# Patient Record
Sex: FEMALE | Race: WHITE | NOT HISPANIC OR LATINO | Employment: PART TIME | URBAN - METROPOLITAN AREA
[De-identification: names, ages, dates, MRNs, and addresses within clinical notes are randomized per-mention and may not be internally consistent; named-entity substitution may affect disease eponyms.]

---
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
## 2020-01-14 ENCOUNTER — CONVERSION ENCOUNTER (OUTPATIENT)
Dept: INTERNAL MEDICINE | Facility: CLINIC | Age: 20
End: 2020-01-14

## 2020-01-14 ENCOUNTER — HOSPITAL ENCOUNTER (OUTPATIENT)
Dept: OTHER | Facility: HOSPITAL | Age: 20
Discharge: HOME | End: 2020-01-14
Attending: NURSE PRACTITIONER

## 2020-01-14 ENCOUNTER — OFFICE VISIT CONVERTED (OUTPATIENT)
Dept: INTERNAL MEDICINE | Facility: CLINIC | Age: 20
End: 2020-01-14
Attending: NURSE PRACTITIONER

## 2020-01-14 LAB
ALBUMIN SERPL-MCNC: 4.4 G/DL (ref 3.5–5)
ALBUMIN/GLOB SERPL: 1.5 {RATIO} (ref 1.4–2.6)
ALP SERPL-CCNC: 57 U/L (ref 50–130)
ALT SERPL-CCNC: 21 U/L (ref 10–40)
ANION GAP SERPL CALC-SCNC: 16 MMOL/L (ref 8–19)
AST SERPL-CCNC: 19 U/L (ref 15–50)
BASOPHILS # BLD AUTO: 0.02 10*3/UL (ref 0–0.2)
BASOPHILS NFR BLD AUTO: 0.3 % (ref 0–3)
BILIRUB SERPL-MCNC: 0.4 MG/DL (ref 0.2–1.3)
BUN SERPL-MCNC: 8 MG/DL (ref 5–25)
BUN/CREAT SERPL: 11 {RATIO} (ref 6–20)
CALCIUM SERPL-MCNC: 9.1 MG/DL (ref 8.7–10.4)
CHLORIDE SERPL-SCNC: 101 MMOL/L (ref 99–111)
CHOLEST SERPL-MCNC: 143 MG/DL (ref 107–200)
CHOLEST/HDLC SERPL: 3.9 {RATIO} (ref 3–6)
CONV ABS IMM GRAN: 0.03 10*3/UL (ref 0–0.2)
CONV CO2: 23 MMOL/L (ref 22–32)
CONV IMMATURE GRAN: 0.5 % (ref 0–1.8)
CONV TOTAL PROTEIN: 7.4 G/DL (ref 6.3–8.2)
CREAT UR-MCNC: 0.72 MG/DL (ref 0.5–0.9)
DEPRECATED RDW RBC AUTO: 37 FL (ref 36.4–46.3)
EOSINOPHIL # BLD AUTO: 0.12 10*3/UL (ref 0–0.7)
EOSINOPHIL # BLD AUTO: 1.9 % (ref 0–7)
ERYTHROCYTE [DISTWIDTH] IN BLOOD BY AUTOMATED COUNT: 11.7 % (ref 11.7–14.4)
GFR SERPLBLD BASED ON 1.73 SQ M-ARVRAT: >60 ML/MIN/{1.73_M2}
GLOBULIN UR ELPH-MCNC: 3 G/DL (ref 2–3.5)
GLUCOSE SERPL-MCNC: 88 MG/DL (ref 65–99)
HCT VFR BLD AUTO: 40 % (ref 37–47)
HDLC SERPL-MCNC: 37 MG/DL (ref 40–60)
HGB BLD-MCNC: 13.3 G/DL (ref 12–16)
LDLC SERPL CALC-MCNC: 90 MG/DL (ref 70–100)
LYMPHOCYTES # BLD AUTO: 2.07 10*3/UL (ref 1–5)
LYMPHOCYTES NFR BLD AUTO: 32.1 % (ref 20–45)
MCH RBC QN AUTO: 29 PG (ref 27–31)
MCHC RBC AUTO-ENTMCNC: 33.3 G/DL (ref 33–37)
MCV RBC AUTO: 87.1 FL (ref 81–99)
MONOCYTES # BLD AUTO: 0.55 10*3/UL (ref 0.2–1.2)
MONOCYTES NFR BLD AUTO: 8.5 % (ref 3–10)
NEUTROPHILS # BLD AUTO: 3.66 10*3/UL (ref 2–8)
NEUTROPHILS NFR BLD AUTO: 56.7 % (ref 30–85)
NRBC CBCN: 0 % (ref 0–0.7)
OSMOLALITY SERPL CALC.SUM OF ELEC: 280 MOSM/KG (ref 273–304)
PLATELET # BLD AUTO: 257 10*3/UL (ref 130–400)
PMV BLD AUTO: 10.6 FL (ref 9.4–12.3)
POTASSIUM SERPL-SCNC: 3.9 MMOL/L (ref 3.5–5.3)
RBC # BLD AUTO: 4.59 10*6/UL (ref 4.2–5.4)
SODIUM SERPL-SCNC: 136 MMOL/L (ref 135–147)
TRIGL SERPL-MCNC: 80 MG/DL (ref 40–150)
TSH SERPL-ACNC: 1.55 M[IU]/L (ref 0.27–4.2)
VLDLC SERPL-MCNC: 16 MG/DL (ref 5–37)
WBC # BLD AUTO: 6.45 10*3/UL (ref 4.8–10.8)

## 2020-08-21 ENCOUNTER — TELEMEDICINE CONVERTED (OUTPATIENT)
Dept: INTERNAL MEDICINE | Facility: CLINIC | Age: 20
End: 2020-08-21
Attending: NURSE PRACTITIONER

## 2020-09-21 ENCOUNTER — TELEMEDICINE CONVERTED (OUTPATIENT)
Dept: INTERNAL MEDICINE | Facility: CLINIC | Age: 20
End: 2020-09-21
Attending: NURSE PRACTITIONER

## 2020-12-03 ENCOUNTER — OFFICE VISIT CONVERTED (OUTPATIENT)
Dept: INTERNAL MEDICINE | Facility: CLINIC | Age: 20
End: 2020-12-03
Attending: STUDENT IN AN ORGANIZED HEALTH CARE EDUCATION/TRAINING PROGRAM

## 2020-12-03 ENCOUNTER — HOSPITAL ENCOUNTER (OUTPATIENT)
Dept: OTHER | Facility: HOSPITAL | Age: 20
Discharge: HOME | End: 2020-12-03
Attending: STUDENT IN AN ORGANIZED HEALTH CARE EDUCATION/TRAINING PROGRAM

## 2020-12-07 LAB
CONV HIV-1/ HIV-2: NONREACTIVE
HSV I/II IGM: <0.91 RATIO (ref 0–0.9)
HSV1 IGG SER IA-ACNC: <0.91 INDEX (ref 0–0.9)
HSV2 IGG SER IA-ACNC: <0.91 INDEX (ref 0–0.9)

## 2020-12-08 LAB
C TRACH RRNA CVX QL NAA+PROBE: NEGATIVE
CONV TREPONEMA PALLIDUM (RPR) WITH FTA-ABS, TP-PA REFLEXES: NON REACTIVE
N GONORRHOEA DNA SPEC QL NAA+PROBE: NEGATIVE

## 2021-02-26 ENCOUNTER — OFFICE VISIT CONVERTED (OUTPATIENT)
Dept: INTERNAL MEDICINE | Facility: CLINIC | Age: 21
End: 2021-02-26
Attending: NURSE PRACTITIONER

## 2021-02-26 ENCOUNTER — HOSPITAL ENCOUNTER (OUTPATIENT)
Dept: OTHER | Facility: HOSPITAL | Age: 21
Discharge: HOME | End: 2021-02-26
Attending: NURSE PRACTITIONER

## 2021-02-26 LAB
ALBUMIN SERPL-MCNC: 4.5 G/DL (ref 3.5–5)
ALBUMIN/GLOB SERPL: 1.5 {RATIO} (ref 1.4–2.6)
ALP SERPL-CCNC: 59 U/L (ref 42–98)
ALT SERPL-CCNC: 18 U/L (ref 10–40)
ANION GAP SERPL CALC-SCNC: 17 MMOL/L (ref 8–19)
AST SERPL-CCNC: 20 U/L (ref 15–50)
BASOPHILS # BLD AUTO: 0.02 10*3/UL (ref 0–0.2)
BASOPHILS NFR BLD AUTO: 0.3 % (ref 0–3)
BILIRUB SERPL-MCNC: 0.33 MG/DL (ref 0.2–1.3)
BUN SERPL-MCNC: 11 MG/DL (ref 5–25)
BUN/CREAT SERPL: 13 {RATIO} (ref 6–20)
CALCIUM SERPL-MCNC: 9.2 MG/DL (ref 8.7–10.4)
CHLORIDE SERPL-SCNC: 103 MMOL/L (ref 99–111)
CHOLEST SERPL-MCNC: 144 MG/DL (ref 107–200)
CHOLEST/HDLC SERPL: 2.7 {RATIO} (ref 3–6)
CONV ABS IMM GRAN: 0.01 10*3/UL (ref 0–0.2)
CONV CO2: 25 MMOL/L (ref 22–32)
CONV IMMATURE GRAN: 0.2 % (ref 0–1.8)
CONV TOTAL PROTEIN: 7.5 G/DL (ref 6.3–8.2)
CREAT UR-MCNC: 0.86 MG/DL (ref 0.5–0.9)
DEPRECATED RDW RBC AUTO: 37.2 FL (ref 36.4–46.3)
EOSINOPHIL # BLD AUTO: 0.1 10*3/UL (ref 0–0.7)
EOSINOPHIL # BLD AUTO: 1.6 % (ref 0–7)
ERYTHROCYTE [DISTWIDTH] IN BLOOD BY AUTOMATED COUNT: 11.7 % (ref 11.7–14.4)
GFR SERPLBLD BASED ON 1.73 SQ M-ARVRAT: >60 ML/MIN/{1.73_M2}
GLOBULIN UR ELPH-MCNC: 3 G/DL (ref 2–3.5)
GLUCOSE SERPL-MCNC: 88 MG/DL (ref 65–99)
HCT VFR BLD AUTO: 38.9 % (ref 37–47)
HDLC SERPL-MCNC: 53 MG/DL (ref 40–60)
HGB BLD-MCNC: 13 G/DL (ref 12–16)
LDLC SERPL CALC-MCNC: 77 MG/DL (ref 70–100)
LYMPHOCYTES # BLD AUTO: 2.34 10*3/UL (ref 1–5)
LYMPHOCYTES NFR BLD AUTO: 36.7 % (ref 20–45)
MCH RBC QN AUTO: 29.1 PG (ref 27–31)
MCHC RBC AUTO-ENTMCNC: 33.4 G/DL (ref 33–37)
MCV RBC AUTO: 87.2 FL (ref 81–99)
MONOCYTES # BLD AUTO: 0.61 10*3/UL (ref 0.2–1.2)
MONOCYTES NFR BLD AUTO: 9.6 % (ref 3–10)
NEUTROPHILS # BLD AUTO: 3.3 10*3/UL (ref 2–8)
NEUTROPHILS NFR BLD AUTO: 51.6 % (ref 30–85)
NRBC CBCN: 0 % (ref 0–0.7)
OSMOLALITY SERPL CALC.SUM OF ELEC: 291 MOSM/KG (ref 273–304)
PLATELET # BLD AUTO: 268 10*3/UL (ref 130–400)
PMV BLD AUTO: 10.9 FL (ref 9.4–12.3)
POTASSIUM SERPL-SCNC: 3.6 MMOL/L (ref 3.5–5.3)
RBC # BLD AUTO: 4.46 10*6/UL (ref 4.2–5.4)
SODIUM SERPL-SCNC: 141 MMOL/L (ref 135–147)
TRIGL SERPL-MCNC: 71 MG/DL (ref 40–150)
TSH SERPL-ACNC: 0.94 M[IU]/L (ref 0.27–4.2)
VLDLC SERPL-MCNC: 14 MG/DL (ref 5–37)
WBC # BLD AUTO: 6.38 10*3/UL (ref 4.8–10.8)

## 2021-04-30 ENCOUNTER — CONVERSION ENCOUNTER (OUTPATIENT)
Dept: INTERNAL MEDICINE | Facility: CLINIC | Age: 21
End: 2021-04-30

## 2021-05-13 NOTE — PROGRESS NOTES
Progress Note      Patient Name: Crystal Fitzgerald   Patient ID: [id number]   Sex: Female   YOB: 2000    Primary Care Provider: Lindsay TELLES   Referring Provider: Lindsay TELLES    Visit Date: August 21, 2020    Provider: KOKI French   Location: [facility]   Location Address: [address] [place] [address], [address]  [place], [place]  [id number]   Location Phone: [phone]          Chief Complaint  · recommendations on heart burn issues, medications      History Of Present Illness  Crystal Fitzgerald is a 20 year old /White female who presents for evaluation and treatment of:   Video Conferencing Visit  Crystal Fitzgerald is a 20 year old /White female who is presenting for evaluation via video conferencing via Zoom. Verbal consent obtained before beginning visit.   The following staff were present during this visit: Arti Bain MA; KOKI French      Informed patient that as visit is being performed as a video conference there will be no opportunity to obtain vital signs or perform a thorough physical exam. Due to this there is unfortunately a possibility that things may be missed that would typically be noticed during a traditional visit. Patient is aware of this possibility and agrees to proceed with the video conference. Patient states there is no other person present for this video conference. Call via Zoom.    GERD-  Previously managed with Zantac, has been off for over a year. Patient reports when she went on vacation symptoms returned and have not improved with Pepto Bismol or Tums. She would like to restart a medication at this time. Patient reports constant discomfort, exacerbated by eating. Has vomited on occasion.       Past Medical History  Disease Name Date Onset Notes   Allergies --  --    Asthma --  --    Heart murmur --  --    Pilonidal cyst --  --          Past Surgical History  Procedure Name Date Notes   Ablation 2015 and 2018  --    Appendectomy 2016 --    Cardiac --  --    Cyst Removal 2017 Pilonidal         Allergy List  Allergen Name Date Reaction Notes   amoxicillin --  --  --    iodine --  --  --        Allergies Reconciled  Family Medical History  Disease Name Relative/Age Notes   Family history of prostate cancer  --    Family history of stroke Aunt/  Grandmother (maternal)/   --    Family history of heart disease Grandfather (maternal)/  Grandmother (maternal)/   --    Family history of diabetes mellitus Father/   --          Social History  Finding Status Start/Stop Quantity Notes   Alcohol Never --/-- --  drinks no   Caffeine Current every day --/-- --  drinks regularly; coffee, tea and soft drinks; 3-4 times per day   Second hand smoke exposure Current some day --/-- --  yes   Tobacco Former --/-- --  VAPE         Immunizations  NameDate Admin Mfg Trade Name Lot Number Route Inj VIS Given VIS Publication   Ilskkcjie96/14/2020 PMC Fluzone Quadrivalent [id number] IM RD 01/14/2020    Comments: pt tolerated well   Tdap01/14/2020 SKB BOOSTRIX [id number] IM LD 01/14/2020    Comments: pt tolerated well         Review of Systems  · Constitutional  o Denies  o : fever, fatigue, weight loss, weight gain  · Cardiovascular  o Denies  o : lower extremity edema, chest pressure, palpitations  · Respiratory  o Denies  o : shortness of breath, wheezing, frequent cough, dyspnea on exertion  · Gastrointestinal  o Admits  o : vomiting, reflux/heartburn  o Denies  o : nausea, dysphagia, constipation, diarrhea      Physical Examination     General: Well nourished, no acute distress  HENT: Atraumatic, normocephalic  Eyes: Extraocular movements intact, no scleral icterus  Lungs: Breathing comfortably, without cough  Integumentary: No visible rash or lesion  Neurologic: Grossly oriented to person, place, time; without facial droop  Psych: Normal mood and affect           Assessment  · GERD (gastroesophageal reflux disease)     530.81/K21.9  Patient defers H2  blocker due to concerns regarding Zantac, will trial omeprazole at this time. Discussed lifestyle modifications, including avoiding exacerbating factors, sleeping with the HOB elevated, not lying flat at least 30 minutes after eating. Will follow up in one month to assess medication effectiveness, sooner if concerns arise. Due to patients young age would have low threshold for GI evaluation if warranted.     Problems Reconciled  Plan  · Orders  o ACO-39: Current medications updated and reviewed () - - 08/21/2020  · Medications  o omeprazole 20 mg oral capsule,delayed release(DR/EC)   SIG: take 1 capsule (20 mg) by oral route once daily before a meal   DISP: (30) capsules with 1 refills  Prescribed on 08/21/2020     o Medications have been Reconciled  o Transition of Care or Provider Policy  · Instructions  o Maintain a healthy weight. Avoid tight fitting clothes. Avoid fried, fatty foods, tomato sauce, chocolate, mint, garlic, onion, alcohol. caffeine. Eat smaller meals, dont lie down after a meal, dont smoke. Elevate the head of your bed 6-9 inches.  o Take all medications as prescribed/directed.  o Patient was educated/instructed on their diagnosis, treatment and medications prior to discharge from the clinic today.  o Patient instructed to seek medical attention urgently for new or worsening symptoms.  o Call the office with any concerns or questions.  · Disposition  o Call or Return if symptoms worsen or persist.  o Follow up in 1 month  o Prescriptions sent to pharmacy            Electronically Signed by: KOKI French -Author on August 21, 2020 12:01:59 PM

## 2021-05-13 NOTE — PROGRESS NOTES
"   Progress Note      Patient Name: Crystal Fitzgerald   Patient ID: [id number]   Sex: Female   YOB: 2000    Primary Care Provider: Lindsay TELLES   Referring Provider: Lindsay TELLES    Visit Date: December 3, 2020    Provider: Tammy Adams MD   Location: [facility]   Location Address: [address] [place] [address]  [id number]   Location Phone: [phone]          Chief Complaint  · Acute  · \"I'm getting STD tested.\"      History Of Present Illness  Crystal Fitzgerald is a 20 year old /White female who presents for evaluation and treatment of:      STI/STD screen. Denies any prior hx of STI or STD. She is not currently sexually active, not as of the past 1.5 years. She is attracted to female; lifetime sexual partner is 1 female.          Past Medical History  Disease Name Date Onset Notes   Allergies --  --    Asthma --  --    Heart murmur --  --    Pilonidal cyst --  --          Past Surgical History  Procedure Name Date Notes   Ablation 2015 and 2018 --    Appendectomy 2016 --    Cardiac --  --    Cyst Removal 2017 Pilonidal         Medication List  Name Date Started Instructions   omeprazole 20 mg oral capsule,delayed release(/EC) 08/21/2020 take 1 capsule (20 mg) by oral route once daily before a meal   OMEPRAZOLE 20MG CAPSULES 10/21/2020 TAKE 1 CAPSULE BY MOUTH ONCE DAILY BEFORE A MEAL         Allergy List  Allergen Name Date Reaction Notes   amoxicillin --  --  --    iodine --  --  --          Family Medical History  Disease Name Relative/Age Notes   Family history of prostate cancer  --    Family history of stroke Aunt/  Grandmother (maternal)/   --    Family history of heart disease Grandfather (maternal)/  Grandmother (maternal)/   --    Family history of diabetes mellitus Father/   --          Social History  Finding Status Start/Stop Quantity Notes   Alcohol Never --/-- --  drinks no   Caffeine Current every day --/-- --  drinks " "regularly; coffee, tea and soft drinks; 3-4 times per day   Second hand smoke exposure Current some day --/-- --  yes   Tobacco Former --/-- --  VAPE         Immunizations  NameDate Admin Mfg Trade Name Lot Number Route Inj VIS Given VIS Publication   Qhgvcakwh58/14/2020 PMC Fluzone Quadrivalent [id number] IM RD 01/14/2020    Comments: pt tolerated well   Tdap01/14/2020 SKB BOOSTRIX [id number] IM LD 01/14/2020    Comments: pt tolerated well         Review of Systems  · Constitutional  o Denies  o : fever, fatigue, weight loss, weight gain  · Cardiovascular  o Denies  o : lower extremity edema, claudication, chest pressure, palpitations  · Respiratory  o Denies  o : shortness of breath, wheezing, cough, hemoptysis, dyspnea on exertion  · Gastrointestinal  o Denies  o : nausea, vomiting, diarrhea, constipation, abdominal pain  · Genitourinary  o Denies  o : urgency, frequency, dysuria, genital sores  · Integument  o Denies  o : rash  · Heme-Lymph  o Denies  o : petechiae, lymph node enlargement or tenderness      Vitals  Date Time BP Position Site L\R Cuff Size HR RR TEMP (F) WT  HT  BMI kg/m2 BSA m2 O2 Sat FR L/min FiO2 HC       03/01/2018 10:28 AM       14  218lbs 0oz 5'  6\" 35.19 2.15       01/14/2020 02:13 /74 Sitting    75 - R  97.3 207lbs 2oz 5'  6\" 33.43 2.09 99 %  21%    12/03/2020 04:23 /76 Sitting    75 - R  96.3 193lbs 4oz 5'  6\" 31.19 2.02 100 %  21%          Physical Examination  · Constitutional  o Appearance  o : no acute distress, well-nourished  · Head and Face  o Head  o :   § Inspection  § : atraumatic, normocephalic  · Ears, Nose, Mouth and Throat  o Ears  o :   § External Ears  § : normal  o Nose  o :   § Intranasal Exam  § : nares patent  · Respiratory  o Respiratory Effort  o : breathing comfortably, symmetric chest rise  · Skin and Subcutaneous Tissue  o General Inspection  o : no lesions present, no rashes noted. Tattoos over her right arm.   · Neurologic  o Mental Status Examination  o : "   § Orientation  § : grossly oriented to person, place and time              Assessment  · Screening for HIV (human immunodeficiency virus)     V73.89/Z11.4  Patient without any high risk behavior with 1 lifetime partner, however desires screening which is appropriate.   · STI (sexually transmitted infection)     099.9/A64  Screening for STI in patient with low risk. Lab ordered. Discussed STI/STD prevention and safe sexual practices, including condom use and knowing intimate's partners STD/STI status prior to initiating sexual activity.       Plan  · Orders  o ACO-39: Current medications updated and reviewed (1159F, ) - 099.9/A64, V73.89/Z11.4 - 12/03/2020  o STD Panel (HSV 1 and 2 IgG/IgM, HIV, RPR, GC/Chlamydia) [facility] (52838, 33967, 39694, 25617, 48442, CTNGX, ) - 099.9/A64, V73.89/Z11.4 - 12/03/2020  · Medications  o Medications have been Reconciled  o Transition of Care or Provider Policy  · Instructions  o CDC recommends that everyone between 13 and 64 get tested for HIV at least once as part of routine healthcare.  o Patient was educated/instructed on their diagnosis, treatment and medications prior to discharge from the clinic today.  o Discussed safe sex practices            Electronically Signed by: Tammy Adams MD -Author on December 3, 2020 05:01:52 PM

## 2021-05-13 NOTE — PROGRESS NOTES
Progress Note      Patient Name: Crystal Fitzgerald   Patient ID: [id number]   Sex: Female   YOB: 2000    Primary Care Provider: Lindsay TELLES   Referring Provider: Lindsay TELLES    Visit Date: September 21, 2020    Provider: KOKI French   Location: [facility]   Location Address: [address] [place] [address]  [place], [place]  [id number]   Location Phone: [phone]          Chief Complaint  · Follow up  · No particular concerns      History Of Present Illness  Video Conferencing Visit  Crystal Fitzgerald is a 20 year old /White female who is presenting for evaluation via video conferencing via Zoom. Verbal consent obtained before beginning visit.   The following staff were present during this visit: Araceli Snyder RN; KOKI French      Informed patient that as visit is being performed as a video conference there will be no opportunity to obtain vital signs or perform a thorough physical exam. Due to this there is unfortunately a possibility that things may be missed that would typically be noticed during a traditional visit. Patient is aware of this possibility and agrees to proceed with the video conference. Patient states there is no other person present for this video conference. Call via Zoom.    GERD-  Patient reports symptoms have improved since starting the omeprazole. Reports a few breakthrough symptoms with spicy foods but is otherwise happy with the results.       Past Medical History  Disease Name Date Onset Notes   Allergies --  --    Asthma --  --    Heart murmur --  --    Pilonidal cyst --  --          Past Surgical History  Procedure Name Date Notes   Ablation 2015 and 2018 --    Appendectomy 2016 --    Cardiac --  --    Cyst Removal 2017 Pilonidal         Medication List  Name Date Started Instructions   omeprazole 20 mg oral capsule,delayed release(/EC) 08/21/2020 take 1 capsule (20 mg) by oral route once daily before a meal          Allergy List  Allergen Name Date Reaction Notes   amoxicillin --  --  --    iodine --  --  --        Allergies Reconciled  Family Medical History  Disease Name Relative/Age Notes   Family history of prostate cancer  --    Family history of stroke Aunt/  Grandmother (maternal)/   --    Family history of heart disease Grandfather (maternal)/  Grandmother (maternal)/   --    Family history of diabetes mellitus Father/   --          Social History  Finding Status Start/Stop Quantity Notes   Alcohol Never --/-- --  drinks no   Caffeine Current every day --/-- --  drinks regularly; coffee, tea and soft drinks; 3-4 times per day   Second hand smoke exposure Current some day --/-- --  yes   Tobacco Former --/-- --  VAPE         Immunizations  NameDate Admin Mfg Trade Name Lot Number Route Inj VIS Given VIS Publication   Anujztbzr63/14/2020 PMC Fluzone Quadrivalent [id number] IM RD 01/14/2020    Comments: pt tolerated well   Tdap01/14/2020 SKB BOOSTRIX [id number] IM LD 01/14/2020    Comments: pt tolerated well         Review of Systems  · Constitutional  o Denies  o : fever, fatigue, weight loss, weight gain  · Cardiovascular  o Denies  o : lower extremity edema, chest pressure, palpitations  · Respiratory  o Denies  o : shortness of breath, wheezing, cough, dyspnea on exertion  · Gastrointestinal  o Admits  o : heartburn, reflux  o Denies  o : nausea, vomiting      Physical Examination     General: Well nourished, no acute distress  HENT: Atraumatic, normocephalic  Eyes: Extraocular movements intact, no scleral icterus  Lungs: Breathing comfortably, without cough  Integumentary: No visible rash or lesion  Neurologic: Grossly oriented to person, place, time; without facial droop  Psych: Normal mood and affect               Assessment  · GERD (gastroesophageal reflux disease)     530.81/K21.9  Well controlled, will continue omeprazole at this time. Follow up as scheduled in three months, sooner if concerns arise. Patient will  continue to work on lifestyle modifications as previously discussed.      Plan  · Orders  o ACO-39: Current medications updated and reviewed () - - 09/21/2020  · Medications  o Medications have been Reconciled  o Transition of Care or Provider Policy  · Instructions  o Maintain a healthy weight. Avoid tight fitting clothes. Avoid fried, fatty foods, tomato sauce, chocolate, mint, garlic, onion, alcohol. caffeine. Eat smaller meals, dont lie down after a meal, dont smoke. Elevate the head of your bed 6-9 inches.  o Take all medications as prescribed/directed.  o Patient was educated/instructed on their diagnosis, treatment and medications prior to discharge from the clinic today.  o Patient instructed to seek medical attention urgently for new or worsening symptoms.  o Call the office with any concerns or questions.  · Disposition  o Call or Return if symptoms worsen or persist.  o Follow up as scheduled            Electronically Signed by: KOKI French -Author on September 21, 2020 11:24:54 AM

## 2021-05-14 VITALS
DIASTOLIC BLOOD PRESSURE: 68 MMHG | SYSTOLIC BLOOD PRESSURE: 122 MMHG | WEIGHT: 190 LBS | HEART RATE: 80 BPM | HEIGHT: 66 IN | TEMPERATURE: 98.1 F | BODY MASS INDEX: 30.53 KG/M2 | OXYGEN SATURATION: 100 %

## 2021-05-14 VITALS
TEMPERATURE: 98.5 F | HEART RATE: 92 BPM | WEIGHT: 191 LBS | BODY MASS INDEX: 30.7 KG/M2 | DIASTOLIC BLOOD PRESSURE: 68 MMHG | HEIGHT: 66 IN | OXYGEN SATURATION: 100 % | SYSTOLIC BLOOD PRESSURE: 112 MMHG

## 2021-05-14 VITALS
TEMPERATURE: 96.3 F | HEIGHT: 66 IN | BODY MASS INDEX: 31.06 KG/M2 | DIASTOLIC BLOOD PRESSURE: 76 MMHG | WEIGHT: 193.25 LBS | SYSTOLIC BLOOD PRESSURE: 100 MMHG | HEART RATE: 75 BPM | OXYGEN SATURATION: 100 %

## 2021-05-14 NOTE — PROGRESS NOTES
Progress Note      Patient Name: Crystal Fitzgerald   Patient ID: [id number]   Sex: Female   YOB: 2000    Primary Care Provider: Lindsay TELLES   Referring Provider: Lindsay TELLES    Visit Date: February 26, 2021    Provider: KOKI French   Location: [facility]   Location Address: [address] [place] [address] [address]  [place], [place]  [id number]   Location Phone: [phone]          Chief Complaint  · Annual exam  · No concerns      History Of Present Illness  Crystal Fitzgerald is a 20 year old /White female who presents for evaluation and treatment of:      Influenza vaccination: Would like  LMP: 2/2021  PAP: Not due until 21  Mammogram: Denies family history of breast cancer  Colonoscopy: Denies family history of colon cancer  Eye exam: 1/2021  Dental exam: Every 6 months    Annual physical exam-  Maternal and paternal grandparents with history of heart attack and stroke. Denies chest pain, blurry vision, headache, leg swelling, shortness of breath, seizure activity.     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 Followed by Dr. Fitzgerald in [place], [place] annually. Due to follow up 6/2021. She is without concern at this time.     GERD-  Well controlled with omeprazole.    Skin lesions-  Patient reports lesions noted on the upper part of her vaginal area x 3 months. Negative STD testing 12/2020. Patient states lesions have not changed or spread, are not painful or itchy. Patient does shave. She also reuses towels and washrags. She has never had a PAP smear. Is not currently sexually active but does have a prospective partner. History of one sexual partner, female in gender.       Past Medical History  Disease Name Date Onset Notes   Allergies --  --    Asthma --  --    Heart murmur --  --    Pilonidal cyst --  --    Screening for HIV (human immunodeficiency virus) 12/03/2020 --          Past Surgical History  Procedure Name Date Notes    Ablation 2015 and 2018 --    Appendectomy 2016 --    Cardiac --  --    Cyst Removal 2017 Pilonidal         Medication List  Name Date Started Instructions   omeprazole 20 mg oral capsule,delayed release(/EC) 02/03/2021 take 1 capsule (20 mg) by oral route once daily before a meal         Allergy List  Allergen Name Date Reaction Notes   amoxicillin --  --  --    iodine --  --  --          Family Medical History  Disease Name Relative/Age Notes   Family history of prostate cancer  --    Family history of stroke Aunt/  Grandmother (maternal)/   --    Family history of heart disease Grandfather (maternal)/  Grandmother (maternal)/   --    Family history of diabetes mellitus Father/   --          Social History  Finding Status Start/Stop Quantity Notes   Alcohol Never --/-- --  drinks no   Caffeine Current every day --/-- --  drinks regularly; coffee, tea and soft drinks; 3-4 times per day   Second hand smoke exposure Current some day --/-- --  yes   Tobacco Former --/-- --  VAPE         Immunizations  NameDate Admin Mfg Trade Name Lot Number Route Inj VIS Given VIS Publication   Yhtnztmjx49/26/2021 PMC Fluzone Quadrivalent [id number] IM LD 02/26/2021 08/15/2019   Comments: patient tolerate well. left office in stable condition.   Tdap01/14/2020 SKB BOOSTRIX [id number] IM LD 01/14/2020    Comments: pt tolerated well         Review of Systems  · Constitutional  o Denies  o : fever, fatigue, weight loss, weight gain  · Eyes  o Denies  o : discharge from eye, impaired vision, blurred vision  · HENT  o Denies  o : headaches, vertigo, lightheadedness  · Cardiovascular  o Denies  o : lower extremity edema, chest pressure, palpitations  · Respiratory  o Denies  o : shortness of breath, wheezing, cough, dyspnea on exertion  · Gastrointestinal  o Denies  o : nausea, vomiting, diarrhea, constipation, abdominal pain  · Integument  o Admits  o : new skin lesions  o Denies  o : rash, itching  · Psychiatric  o Denies  o : anxiety,  "depression, suicidal ideation, homicidal ideation      Vitals  Date Time BP Position Site L\R Cuff Size HR RR TEMP (F) WT  HT  BMI kg/m2 BSA m2 O2 Sat FR L/min FiO2 HC       03/01/2018 10:28 AM       14  218lbs 0oz 5'  6\" 35.19 2.15       01/14/2020 02:13 /74 Sitting    75 - R  97.3 207lbs 2oz 5'  6\" 33.43 2.09 99 %  21%    12/03/2020 04:23 /76 Sitting    75 - R  96.3 193lbs 4oz 5'  6\" 31.19 2.02 100 %  21%    02/26/2021 03:31 /68 Sitting    80 - R  98.1 190lbs 0oz 5'  6\" 30.67 2 100 %  21%          Physical Examination  · Constitutional  o Appearance  o : no acute distress, well-nourished  · Head and Face  o Head  o :   § Inspection  § : atraumatic, normocephalic  · Eyes  o Eyes  o : extraocular movements intact, no scleral icterus, no conjunctival injection  · Ears, Nose, Mouth and Throat  o Ears  o :   § External Ears  § : normal  § Otoscopic Examination  § : tympanic membrane appearance within normal limits bilaterally  o Nose  o :   § Intranasal Exam  § : nares patent  o Oral Cavity  o :   § Oral Mucosa  § : moist mucous membranes  o Throat  o :   § Oropharynx  § : no inflammation or lesions present, tonsils within normal limits  · Respiratory  o Respiratory Effort  o : breathing comfortably, symmetric chest rise  o Auscultation of Lungs  o : clear to asculatation bilaterally, no wheezes, rales, or rhonchii  · Cardiovascular  o Heart  o :   § Auscultation of Heart  § : regular rate and rhythm, no murmurs, rubs, or gallops  o Peripheral Vascular System  o :   § Extremities  § : no edema  · Lymphatic  o Neck  o : no lymphadenopathy present  o Supraclavicular Nodes  o : no supraclavicular nodes  · Skin and Subcutaneous Tissue  o General Inspection  o : scattered, grouped, small, dome shaped papular lesions with dimpled center to superior aspect of mons pubis  · Neurologic  o Mental Status Examination  o :   § Orientation  § : grossly oriented to person, place and time  o Gait and Station  o : "   § Gait Screening  § : normal gait  · Psychiatric  o General  o : normal mood and affect              Assessment  · Screening for depression     V79.0/Z13.89  PHQ9 score of 4.  · Need for influenza vaccination     V04.81/Z23  Influenza vaccination in clinic today.   · Annual physical exam     V70.0/Z00.00  Basic labs in clinic today. Encouraged routine dental and eye exams.   · GERD (gastroesophageal reflux disease)     530.81/K21.9  Well controlled, continue omeprazole.   · Skin lesions     709.9/L98.9  High suspicion for molluscum contagiosum based on presentation. Discussed viral etiology and spread of lesions from skin to skin or skin to surface contact. Educated patient that lesions typically resolve on their own within a few months time, however sometimes require intervention such as cryotherapy. Discussed prevention of spread by not touching the lesions and touching other parts of the body or objects. Due to location of lesions encouraged patient to avoid sexual activity to prevent spread to partners. Will follow up in one month to further evaluate, could consider cryotherapy at that visit based on presentation and high risk for spread during sexual activity. STD screening testing reviewed, all negative. Due for PAP smear 6/2021.  · WPW (Tracy-Parkinson-White syndrome)     426.7/I45.6  Follow up with cardiology as scheduled. Will continue to monitor.    Problems Reconciled  Plan  · Orders  o Fluzone Quadrivalent Vaccine, age 6 months + (38807) - V04.81/Z23 - 02/26/2021   Vaccine - Influenza; Dose: 0.5; Site: Left Deltoid; Route: Intramuscular; Date: 02/26/2021 16:25:00; Exp: 06/30/2021; Lot: [id number]; Mfg: sanofi pasteur; TradeName: Fluzone Quadrivalent; Administered By: Thuy Cohen MA; Comment: patient tolerate well. left office in stable condition.  o Physical, Primary Care Panel (CBC, CMP, Lipid, TSH) [facility] (40101, 76999, 97552, 81642) - V70.0/Z00.00 - 02/26/2021  o Immunization Admin Fee (Single) ([facility])  (08178) - - 02/26/2021  o ACO-18: Negative screen for clinical depression using a standardized tool () - - 02/26/2021  o ACO-14: Influenza immunization administered or previously received [facility] () - - 02/26/2021  o ACO-39: Current medications updated and reviewed (, 1159F) - - 02/26/2021  · Medications  o Medications have been Reconciled  o Transition of Care or Provider Policy  · Instructions  o Depression Screen completed and scanned into the EMR under the designated folder within the patient's documents.  o Today's PHQ-9 result is 4  o Reviewed health maintenance flowsheet and updated information. Orders were placed and/or patient's response was documented.  o Maintain a healthy weight. Avoid tight fitting clothes. Avoid fried, fatty foods, tomato sauce, chocolate, mint, garlic, onion, alcohol. caffeine. Eat smaller meals, dont lie down after a meal, dont smoke. Elevate the head of your bed 6-9 inches.  o Take all medications as prescribed/directed.  o Patient was educated/instructed on their diagnosis, treatment and medications prior to discharge from the clinic today.  o Patient instructed to seek medical attention urgently for new or worsening symptoms.  o Call the office with any concerns or questions.  · Disposition  o Call or Return if symptoms worsen or persist.  o Follow up in 1 month  o Labs drawn in clinic  o Will call patient with results of labs            Electronically Signed by: KOKI French -Author on February 26, 2021 05:06:34 PM

## 2021-05-15 VITALS
HEIGHT: 66 IN | BODY MASS INDEX: 33.29 KG/M2 | OXYGEN SATURATION: 99 % | SYSTOLIC BLOOD PRESSURE: 118 MMHG | WEIGHT: 207.12 LBS | HEART RATE: 75 BPM | DIASTOLIC BLOOD PRESSURE: 74 MMHG | TEMPERATURE: 97.3 F

## 2021-05-16 VITALS — WEIGHT: 216 LBS | RESPIRATION RATE: 14 BRPM | BODY MASS INDEX: 34.72 KG/M2 | HEIGHT: 66 IN

## 2021-05-16 VITALS — WEIGHT: 216 LBS | RESPIRATION RATE: 16 BRPM | BODY MASS INDEX: 34.72 KG/M2 | HEIGHT: 66 IN

## 2021-05-16 VITALS — RESPIRATION RATE: 16 BRPM | WEIGHT: 216 LBS | BODY MASS INDEX: 34.72 KG/M2 | HEIGHT: 66 IN

## 2021-05-16 VITALS — WEIGHT: 218 LBS | BODY MASS INDEX: 35.03 KG/M2 | HEIGHT: 66 IN | RESPIRATION RATE: 14 BRPM

## 2021-05-16 VITALS — RESPIRATION RATE: 14 BRPM | HEIGHT: 66 IN | BODY MASS INDEX: 35.03 KG/M2 | WEIGHT: 218 LBS

## 2021-06-21 RX ORDER — OMEPRAZOLE 20 MG/1
CAPSULE, DELAYED RELEASE ORAL
Qty: 30 CAPSULE | Refills: 0 | Status: SHIPPED | OUTPATIENT
Start: 2021-06-21 | End: 2021-09-07

## 2021-06-21 RX ORDER — OMEPRAZOLE 20 MG/1
1 CAPSULE, DELAYED RELEASE ORAL DAILY
COMMUNITY
Start: 2021-03-05 | End: 2021-06-21

## 2021-09-07 RX ORDER — OMEPRAZOLE 20 MG/1
CAPSULE, DELAYED RELEASE ORAL
Qty: 30 CAPSULE | Refills: 0 | Status: SHIPPED | OUTPATIENT
Start: 2021-09-07 | End: 2021-11-22

## 2021-11-10 ENCOUNTER — OFFICE VISIT (OUTPATIENT)
Dept: INTERNAL MEDICINE | Facility: CLINIC | Age: 21
End: 2021-11-10

## 2021-11-10 VITALS
WEIGHT: 192.2 LBS | TEMPERATURE: 98 F | HEIGHT: 66 IN | BODY MASS INDEX: 30.89 KG/M2 | SYSTOLIC BLOOD PRESSURE: 125 MMHG | HEART RATE: 100 BPM | DIASTOLIC BLOOD PRESSURE: 62 MMHG | OXYGEN SATURATION: 100 %

## 2021-11-10 DIAGNOSIS — F41.9 ANXIETY: Primary | ICD-10-CM

## 2021-11-10 PROCEDURE — 99213 OFFICE O/P EST LOW 20 MIN: CPT | Performed by: NURSE PRACTITIONER

## 2021-11-10 RX ORDER — CITALOPRAM 10 MG/1
10 TABLET ORAL DAILY
Qty: 60 TABLET | Refills: 1 | Status: SHIPPED | OUTPATIENT
Start: 2021-11-10 | End: 2022-03-11

## 2021-11-10 NOTE — ASSESSMENT & PLAN NOTE
Patient with anxiety.  She is open to the idea of trying some medication to see if this can be well controlled.  Together with discussion we decided that it is most likely her anxiety that is playing more into her disorganization and inability to complete tasks.  We will try Celexa at this time at 10 mg daily, we will follow-up in 8 weeks and see how things are going.  We did have long discussion about side effects of this medication.  She will let her look let us know that she is trying something new and what the side effects may be.

## 2021-11-10 NOTE — PROGRESS NOTES
"Chief Complaint  ADHD and Anxiety    Subjective         Crystal Fitzgerald presents to [facility] for Questions about ADHD and anxiety.    Patient is a 21-year-old female that is in college in [employer].  Patient reports that over the last several weeks she has noticed that it is extremely hard for her to stay on task, mainly at home.  She feels like she will start several things at once and finds it difficult to complete them.  She notices this at work as well.  She is able to stay on task while in class.  Her grades are mediocre.  She states that as a child she had difficulty but never had a formal diagnosis of ADD or ADHD.  She does report that she has had significant issues with anxiety.  She reports that she has always been very fidgety, has not been on any medications to her knowledge.  She reports that overall her mood is, \" straight down the middle\".  She does not feel extremely high or extremely low.         Review of Systems   Constitutional: Negative for chills, fatigue and fever.   Respiratory: Negative for cough, chest tightness and shortness of breath.    Cardiovascular: Negative for chest pain and palpitations.   Gastrointestinal: Negative for diarrhea, nausea and vomiting.   Neurological: Negative for dizziness and headaches.   Psychiatric/Behavioral: Positive for decreased concentration and dysphoric mood. Negative for agitation, behavioral problems, sleep disturbance and suicidal ideas. The patient is nervous/anxious.        Objective   Vital Signs:   /62   Pulse 100   Temp 98 °F (36.7 °C)   Ht 167.6 cm (66\")   Wt 87.2 kg (192 lb 3.2 oz)   SpO2 100%   BMI 31.02 kg/m²     Physical Exam  Constitutional:       Appearance: Normal appearance. She is normal weight.   Cardiovascular:      Rate and Rhythm: Normal rate and regular rhythm.   Pulmonary:      Effort: Pulmonary effort is normal.      Breath sounds: Normal breath sounds.   Neurological:      " General: No focal deficit present.      Mental Status: She is alert and oriented to person, place, and time.   Psychiatric:         Attention and Perception: Attention and perception normal.         Mood and Affect: Mood is anxious. Mood is not depressed.         Speech: Speech normal.         Behavior: Behavior is cooperative.         Thought Content: Thought content normal.         Judgment: Judgment normal.        Result Review :   The following data was reviewed by: KOKI Gonzales on 11/10/2021:                Diagnoses and all orders for this visit:    1. Anxiety (Primary)  Assessment & Plan:  Patient with anxiety.  She is open to the idea of trying some medication to see if this can be well controlled.  Together with discussion we decided that it is most likely her anxiety that is playing more into her disorganization and inability to complete tasks.  We will try Celexa at this time at 10 mg daily, we will follow-up in 8 weeks and see how things are going.  We did have long discussion about side effects of this medication.  She will let her look let us know that she is trying something new and what the side effects may be.      Other orders  -     citalopram (CeleXA) 10 MG tablet; Take 1 tablet by mouth Daily.  Dispense: 60 tablet; Refill: 1        Follow Up {Instructions Charge Capture  Follow-up Communications :23}  Return in about 8 weeks (around 1/5/2022) for Recheck.  Patient was given instructions and counseling regarding her condition or for health maintenance advice. Please see specific information pulled into the AVS if appropriate.     KOKI Gonzales  11/10/2021  This note was electronically signed.

## 2021-11-22 RX ORDER — OMEPRAZOLE 20 MG/1
CAPSULE, DELAYED RELEASE ORAL
Qty: 90 CAPSULE | Refills: 1 | Status: SHIPPED | OUTPATIENT
Start: 2021-11-22 | End: 2022-06-10

## 2022-03-11 RX ORDER — CITALOPRAM 10 MG/1
10 TABLET ORAL DAILY
Qty: 60 TABLET | Refills: 1 | Status: SHIPPED | OUTPATIENT
Start: 2022-03-11 | End: 2022-07-08

## 2022-06-10 RX ORDER — OMEPRAZOLE 20 MG/1
CAPSULE, DELAYED RELEASE ORAL
Qty: 90 CAPSULE | Refills: 1 | Status: SHIPPED | OUTPATIENT
Start: 2022-06-10 | End: 2023-04-03 | Stop reason: SDUPTHER

## 2022-07-08 RX ORDER — CITALOPRAM 10 MG/1
10 TABLET ORAL DAILY
Qty: 60 TABLET | Refills: 1 | Status: SHIPPED | OUTPATIENT
Start: 2022-07-08

## 2023-03-07 RX ORDER — OMEPRAZOLE 20 MG/1
CAPSULE, DELAYED RELEASE ORAL
Qty: 90 CAPSULE | Refills: 1 | OUTPATIENT
Start: 2023-03-07

## 2023-04-03 ENCOUNTER — TELEPHONE (OUTPATIENT)
Dept: INTERNAL MEDICINE | Facility: CLINIC | Age: 23
End: 2023-04-03

## 2023-04-03 NOTE — TELEPHONE ENCOUNTER
Caller: Crystal Fitzgerald    Relationship: Self    Best call back number: [phone]    Requested Prescriptions:   Requested Prescriptions     Pending Prescriptions Disp Refills   • omeprazole (priLOSEC) 20 MG capsule 90 capsule 1        Pharmacy where request should be sent: [id number] [place] [id number] [facility] [phone] [facility] [phone]      Last office visit with prescribing clinician: Visit date not found   Last telemedicine visit with prescribing clinician: 5/19/2023   Next office visit with prescribing clinician: 5/19/2023         Does the patient have less than a 3 day supply:  [x] Yes  [] No      Zakia Herrmann Rep   04/03/23 11:40 EDT

## 2023-04-06 RX ORDER — OMEPRAZOLE 20 MG/1
20 CAPSULE, DELAYED RELEASE ORAL DAILY
Qty: 90 CAPSULE | Refills: 1 | Status: SHIPPED | OUTPATIENT
Start: 2023-04-06

## 2023-04-10 NOTE — TELEPHONE ENCOUNTER
Attempted to call pt last Wednesday and today. No answer on any numbers. Pt may call the office with further questions. Medication sent to pharmacy last week.

## 2023-05-23 ENCOUNTER — OFFICE VISIT (OUTPATIENT)
Dept: INTERNAL MEDICINE | Facility: CLINIC | Age: 23
End: 2023-05-23
Payer: COMMERCIAL

## 2023-05-23 VITALS
SYSTOLIC BLOOD PRESSURE: 116 MMHG | WEIGHT: 187.8 LBS | TEMPERATURE: 97.2 F | HEART RATE: 82 BPM | DIASTOLIC BLOOD PRESSURE: 69 MMHG | BODY MASS INDEX: 30.31 KG/M2 | OXYGEN SATURATION: 97 %

## 2023-05-23 DIAGNOSIS — R41.840 DIFFICULTY CONCENTRATING: ICD-10-CM

## 2023-05-23 DIAGNOSIS — Z00.00 ANNUAL PHYSICAL EXAM: Primary | ICD-10-CM

## 2023-05-23 DIAGNOSIS — Z86.79 HISTORY OF WOLFF-PARKINSON-WHITE (WPW) SYNDROME: ICD-10-CM

## 2023-05-23 DIAGNOSIS — R41.840 INATTENTION: ICD-10-CM

## 2023-05-23 DIAGNOSIS — Q22.5 EBSTEIN'S ANOMALY: ICD-10-CM

## 2023-05-23 LAB
ALBUMIN SERPL-MCNC: 4.6 G/DL (ref 3.5–5.2)
ALBUMIN/GLOB SERPL: 1.5 G/DL
ALP SERPL-CCNC: 60 U/L (ref 39–117)
ALT SERPL W P-5'-P-CCNC: 19 U/L (ref 1–33)
ANION GAP SERPL CALCULATED.3IONS-SCNC: 10 MMOL/L (ref 5–15)
AST SERPL-CCNC: 19 U/L (ref 1–32)
BASOPHILS # BLD AUTO: 0.03 10*3/MM3 (ref 0–0.2)
BASOPHILS NFR BLD AUTO: 0.5 % (ref 0–1.5)
BILIRUB SERPL-MCNC: 0.3 MG/DL (ref 0–1.2)
BUN SERPL-MCNC: 11 MG/DL (ref 6–20)
BUN/CREAT SERPL: 14.7 (ref 7–25)
CALCIUM SPEC-SCNC: 9.7 MG/DL (ref 8.6–10.5)
CHLORIDE SERPL-SCNC: 104 MMOL/L (ref 98–107)
CHOLEST SERPL-MCNC: 148 MG/DL (ref 0–200)
CO2 SERPL-SCNC: 26 MMOL/L (ref 22–29)
CREAT SERPL-MCNC: 0.75 MG/DL (ref 0.57–1)
DEPRECATED RDW RBC AUTO: 38.5 FL (ref 37–54)
EGFRCR SERPLBLD CKD-EPI 2021: 115.6 ML/MIN/1.73
EOSINOPHIL # BLD AUTO: 0.12 10*3/MM3 (ref 0–0.4)
EOSINOPHIL NFR BLD AUTO: 1.9 % (ref 0.3–6.2)
ERYTHROCYTE [DISTWIDTH] IN BLOOD BY AUTOMATED COUNT: 12.1 % (ref 12.3–15.4)
GLOBULIN UR ELPH-MCNC: 3 GM/DL
GLUCOSE SERPL-MCNC: 91 MG/DL (ref 65–99)
HCT VFR BLD AUTO: 42.8 % (ref 34–46.6)
HDLC SERPL-MCNC: 41 MG/DL (ref 40–60)
HGB BLD-MCNC: 14.2 G/DL (ref 12–15.9)
IMM GRANULOCYTES # BLD AUTO: 0.02 10*3/MM3 (ref 0–0.05)
IMM GRANULOCYTES NFR BLD AUTO: 0.3 % (ref 0–0.5)
LDLC SERPL CALC-MCNC: 77 MG/DL (ref 0–100)
LDLC/HDLC SERPL: 1.76 {RATIO}
LYMPHOCYTES # BLD AUTO: 1.93 10*3/MM3 (ref 0.7–3.1)
LYMPHOCYTES NFR BLD AUTO: 30.7 % (ref 19.6–45.3)
MCH RBC QN AUTO: 28.9 PG (ref 26.6–33)
MCHC RBC AUTO-ENTMCNC: 33.2 G/DL (ref 31.5–35.7)
MCV RBC AUTO: 87 FL (ref 79–97)
MONOCYTES # BLD AUTO: 0.58 10*3/MM3 (ref 0.1–0.9)
MONOCYTES NFR BLD AUTO: 9.2 % (ref 5–12)
NEUTROPHILS NFR BLD AUTO: 3.61 10*3/MM3 (ref 1.7–7)
NEUTROPHILS NFR BLD AUTO: 57.4 % (ref 42.7–76)
NRBC BLD AUTO-RTO: 0 /100 WBC (ref 0–0.2)
PLATELET # BLD AUTO: 280 10*3/MM3 (ref 140–450)
PMV BLD AUTO: 11.2 FL (ref 6–12)
POTASSIUM SERPL-SCNC: 4.3 MMOL/L (ref 3.5–5.2)
PROT SERPL-MCNC: 7.6 G/DL (ref 6–8.5)
RBC # BLD AUTO: 4.92 10*6/MM3 (ref 3.77–5.28)
SODIUM SERPL-SCNC: 140 MMOL/L (ref 136–145)
T4 FREE SERPL-MCNC: 1.48 NG/DL (ref 0.93–1.7)
TRIGL SERPL-MCNC: 175 MG/DL (ref 0–150)
TSH SERPL DL<=0.05 MIU/L-ACNC: 1.02 UIU/ML (ref 0.27–4.2)
VLDLC SERPL-MCNC: 30 MG/DL (ref 5–40)
WBC NRBC COR # BLD: 6.29 10*3/MM3 (ref 3.4–10.8)

## 2023-05-23 PROCEDURE — 99395 PREV VISIT EST AGE 18-39: CPT | Performed by: NURSE PRACTITIONER

## 2023-05-23 PROCEDURE — 84439 ASSAY OF FREE THYROXINE: CPT | Performed by: NURSE PRACTITIONER

## 2023-05-23 PROCEDURE — 80050 GENERAL HEALTH PANEL: CPT | Performed by: NURSE PRACTITIONER

## 2023-05-23 PROCEDURE — 80061 LIPID PANEL: CPT | Performed by: NURSE PRACTITIONER

## 2023-05-23 PROCEDURE — 36415 COLL VENOUS BLD VENIPUNCTURE: CPT | Performed by: NURSE PRACTITIONER

## 2023-05-23 NOTE — PROGRESS NOTES
Chief Complaint  Annual Exam (Physical )    Subjective         Crystal Fitzgerald presents to [facility]  Last labs: 2021  LMP: May 2023  PAP: 2023, normal   Mammogram: Denies family history of breast cancer  Colonoscopy: Denies family history of colon cancer  COVID19 vaccination: Declines  Eye exam: 2023  Dental exam: 2023  Smoking history: Social   Specialists: None    Annual physical exam-  Patient reports her maternal grandfather had a heart attack and stroke, maternal grandmother also with cardiac issues. Denies chest pain, blurry vision, headache, leg swelling, shortness of breath, seizure activity    Patient with history of WPW with surgery x2.  Also with Ebstein's anomaly.  Patient admits to palpitations with exertion.  At times feels that her heart stops, goes fast or switches beats quickly.  States this is not as bad as it was prior to her surgery.  She is supposed to follow with a cardiologist but has not been.  Is not interested in referral or further evaluation at this time.    Patient would like to be evaluated for ADHD.  Admits that she has trouble focusing, often has trouble remembering what she did 10 minutes ago.  She was previously started on Celexa but this did not help.  Patient states she feels like she is losing chunks of time due to having too much going on in her head.  She has been able to maintain her grades, but admits to procrastination.  She has tried making lists but still cannot stick to her plan.  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           Objective     Vitals:    05/23/23 1342   BP: 116/69   Pulse: 82   Temp: 97.2 °F (36.2 °C)   TempSrc: Temporal   SpO2: 97%   Weight: 85.2 kg (187 lb 12.8 oz)      Body mass index is 30.31 kg/m².    Wt Readings from Last 3 Encounters:   05/23/23 85.2 kg (187 lb 12.8 oz)   11/10/21 87.2 kg (192 lb 3.2  oz)   04/30/21 86.6 kg (191 lb)     BP Readings from Last 3 Encounters:   05/23/23 116/69   11/10/21 125/62   04/30/21 112/68                Physical Exam  Constitutional:       Appearance: Normal appearance.   HENT:      Head: Normocephalic and atraumatic.      Right Ear: Tympanic membrane normal.      Left Ear: Tympanic membrane normal.      Nose: Nose normal.      Mouth/Throat:      Mouth: Mucous membranes are moist.      Pharynx: Oropharynx is clear.   Eyes:      Extraocular Movements: Extraocular movements intact.      Conjunctiva/sclera: Conjunctivae normal.      Pupils: Pupils are equal, round, and reactive to light.   Neck:      Thyroid: No thyroid mass, thyromegaly or thyroid tenderness.   Cardiovascular:      Rate and Rhythm: Normal rate and regular rhythm.      Heart sounds: Normal heart sounds.   Pulmonary:      Effort: Pulmonary effort is normal.      Breath sounds: Normal breath sounds.   Abdominal:      General: Bowel sounds are normal.      Palpations: Abdomen is soft.   Skin:     General: Skin is warm and dry.   Neurological:      General: No focal deficit present.      Mental Status: She is alert and oriented to person, place, and time.   Psychiatric:         Mood and Affect: Mood normal.         Behavior: Behavior normal.         Thought Content: Thought content normal.          Result Review :   The following data was reviewed by: KOKI French on 05/23/2023:      Procedures    Assessment and Plan   Diagnoses and all orders for this visit:    1. Annual physical exam (Primary)  Assessment & Plan:  Basic labs in clinic today. Encouraged routine dental and eye exams. Discussed age appropriate immunizations, screenings, nutrition and exercise. Age appropriate handout provided.      Orders:  -     Comprehensive Metabolic Panel  -     CBC & Differential  -     TSH  -     Lipid Panel  -     T4, Free    2. Difficulty concentrating  Assessment & Plan:  Will refer to psychiatry for evaluation for  adult ADHD.  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    Orders:  -     Cancel: Ambulatory Referral to Psychiatry  -     Ambulatory Referral to Psychiatry    3. Inattention  -     Cancel: Ambulatory Referral to Psychiatry  -     Ambulatory Referral to Psychiatry    4. Ebstein's anomaly  Assessment & Plan:  Discussed with patient the importance of cardiology referral, states she will consider but is not interested at this time.  She should seek medical attention immediately with severe/persistent chest pain, palpitations, shortness of breath.  She will call return to clinic if she would like to proceed with referral or EKG.      5. History of Tracy-Parkinson-White (WPW) syndrome        Follow Up   Return for Annual physical.  Patient was given instructions and counseling regarding her condition or for health maintenance advice. Please see specific information pulled into the AVS if appropriate.

## 2023-05-24 PROBLEM — R41.840 INATTENTION: Status: ACTIVE | Noted: 2023-05-24

## 2023-05-24 PROBLEM — Q22.5 EBSTEIN'S ANOMALY: Status: ACTIVE | Noted: 2023-05-24

## 2023-05-24 PROBLEM — Z86.79 HISTORY OF WOLFF-PARKINSON-WHITE (WPW) SYNDROME: Status: ACTIVE | Noted: 2023-05-24

## 2023-05-24 PROBLEM — R41.840 DIFFICULTY CONCENTRATING: Status: ACTIVE | Noted: 2023-05-24

## 2023-05-24 PROBLEM — Z00.00 ANNUAL PHYSICAL EXAM: Status: ACTIVE | Noted: 2023-05-24

## 2023-05-24 NOTE — ASSESSMENT & PLAN NOTE
Will refer to psychiatry for evaluation for adult ADHD.  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

## 2023-05-24 NOTE — ASSESSMENT & PLAN NOTE
Discussed with patient the importance of cardiology referral, states she will consider but is not interested at this time.  She should seek medical attention immediately with severe/persistent chest pain, palpitations, shortness of breath.  She will call return to clinic if she would like to proceed with referral or EKG.

## 2023-06-02 ENCOUNTER — LAB (OUTPATIENT)
Dept: LAB | Facility: HOSPITAL | Age: 23
End: 2023-06-02
Payer: COMMERCIAL

## 2023-06-02 DIAGNOSIS — Z20.822 COUGH WITH EXPOSURE TO COVID-19 VIRUS: Primary | ICD-10-CM

## 2023-06-02 DIAGNOSIS — R05.8 COUGH WITH EXPOSURE TO COVID-19 VIRUS: Primary | ICD-10-CM

## 2023-06-02 LAB — SARS-COV-2 RNA RESP QL NAA+PROBE: DETECTED

## 2023-06-02 PROCEDURE — 87635 SARS-COV-2 COVID-19 AMP PRB: CPT

## 2024-01-15 RX ORDER — OMEPRAZOLE 20 MG/1
20 CAPSULE, DELAYED RELEASE ORAL DAILY
Qty: 90 CAPSULE | Refills: 1 | Status: SHIPPED | OUTPATIENT
Start: 2024-01-15

## 2024-03-29 ENCOUNTER — OFFICE VISIT (OUTPATIENT)
Dept: INTERNAL MEDICINE | Facility: CLINIC | Age: 24
End: 2024-03-29
Payer: COMMERCIAL

## 2024-03-29 VITALS
TEMPERATURE: 98.3 F | DIASTOLIC BLOOD PRESSURE: 80 MMHG | OXYGEN SATURATION: 96 % | HEIGHT: 66 IN | WEIGHT: 191.2 LBS | SYSTOLIC BLOOD PRESSURE: 118 MMHG | HEART RATE: 84 BPM | BODY MASS INDEX: 30.73 KG/M2

## 2024-03-29 DIAGNOSIS — N89.8 VAGINAL DISCHARGE: ICD-10-CM

## 2024-03-29 DIAGNOSIS — F41.1 GENERALIZED ANXIETY DISORDER: ICD-10-CM

## 2024-03-29 DIAGNOSIS — Z86.79 HISTORY OF WOLFF-PARKINSON-WHITE (WPW) SYNDROME: ICD-10-CM

## 2024-03-29 DIAGNOSIS — R41.840 INATTENTION: ICD-10-CM

## 2024-03-29 DIAGNOSIS — Q22.5 EBSTEIN'S ANOMALY: ICD-10-CM

## 2024-03-29 DIAGNOSIS — Z01.419 ENCOUNTER FOR ROUTINE GYNECOLOGICAL EXAMINATION WITH PAPANICOLAOU SMEAR OF CERVIX: Primary | ICD-10-CM

## 2024-03-29 DIAGNOSIS — R41.840 DIFFICULTY CONCENTRATING: ICD-10-CM

## 2024-03-29 LAB
CANDIDA SPECIES: NEGATIVE
GARDNERELLA VAGINALIS: NEGATIVE
T VAGINALIS DNA VAG QL PROBE+SIG AMP: NEGATIVE

## 2024-03-29 PROCEDURE — 87480 CANDIDA DNA DIR PROBE: CPT | Performed by: NURSE PRACTITIONER

## 2024-03-29 PROCEDURE — G0123 SCREEN CERV/VAG THIN LAYER: HCPCS | Performed by: NURSE PRACTITIONER

## 2024-03-29 PROCEDURE — 99395 PREV VISIT EST AGE 18-39: CPT | Performed by: NURSE PRACTITIONER

## 2024-03-29 PROCEDURE — 87510 GARDNER VAG DNA DIR PROBE: CPT | Performed by: NURSE PRACTITIONER

## 2024-03-29 PROCEDURE — 87660 TRICHOMONAS VAGIN DIR PROBE: CPT | Performed by: NURSE PRACTITIONER

## 2024-03-29 NOTE — PROGRESS NOTES
Subjective   History of Present Illness    Crystal Fitzgerald is a 23 y.o. female who presents for annual exam.    Obstetric History:  OB History    No obstetric history on file.        Menstrual History:     No LMP recorded.       Sexual History:               Current contraception: none, two days late on period, denies potential pregnancy  History of abnormal Pap smear: no  Received Gardasil immunization: no  Family history of uterine or ovarian cancer: no  Family History of cervical cancer: no  Family History of colon cancer/colon polyps: no  Regular self breast exam: yes  History of abnormal mammogram: no  Family history of breast cancer: no  History of abnormal lipids: no    She would like to have her dog registered as an emotional support animal.  Needs a referral to local cardiologist for WPW.     The following portions of the patient's history were reviewed and updated as appropriate: allergies, current medications, past family history, past medical history, past social history, past surgical history, and problem list.      Objective     Physical Exam  Vitals and nursing note reviewed. Exam conducted with a chaperone present (Melony PAGE).   Constitutional:       Appearance: Normal appearance.   Cardiovascular:      Rate and Rhythm: Normal rate and regular rhythm.   Pulmonary:      Effort: Pulmonary effort is normal.      Breath sounds: Normal breath sounds.   Chest:   Breasts:     Right: Normal.      Left: Normal.   Abdominal:      General: Bowel sounds are normal.      Palpations: Abdomen is soft.   Genitourinary:     Vagina: Normal.      Cervix: Normal.      Uterus: Normal.       Adnexa: Right adnexa normal and left adnexa normal.      Rectum: Normal.      Comments: White discharge and bleeding noted from cervix  Musculoskeletal:      Cervical back: Normal range of motion and neck supple.   Lymphadenopathy:      Upper Body:      Right upper body: No axillary adenopathy.      Left upper body: No axillary  "adenopathy.   Neurological:      General: No focal deficit present.      Mental Status: She is alert and oriented to person, place, and time.   Psychiatric:         Mood and Affect: Mood normal.           /80 (BP Location: Left arm, Patient Position: Sitting, Cuff Size: Adult)   Pulse 84   Temp 98.3 °F (36.8 °C) (Temporal)   Ht 167.6 cm (65.98\")   Wt 86.7 kg (191 lb 3.2 oz)   SpO2 96%   BMI 30.88 kg/m²       Assessment & Plan   Diagnoses and all orders for this visit:    1. Encounter for routine gynecological examination with Papanicolaou smear of cervix (Primary)  Assessment & Plan:  Exam findings likely secondary to menses. Discussed preventative care with routine PAP smears and mammogram starting at age 40. Will determine further intervention based on results of PAP smear.      Orders:  -     IGP,rfx Aptima HPV All Pth  -     Gardnerella vaginalis, Trichomonas vaginalis, Candida albicans, DNA - Swab, Vagina    2. Vaginal discharge  -     Gardnerella vaginalis, Trichomonas vaginalis, Candida albicans, DNA - Swab, Vagina    3. Generalized anxiety disorder  Assessment & Plan:  Referral to psychiatry to discuss further.    Orders:  -     Ambulatory Referral to Psychiatry    4. Difficulty concentrating  -     Ambulatory Referral to Psychiatry    5. Inattention  -     Ambulatory Referral to Psychiatry    6. History of Tracy-Parkinson-White (WPW) syndrome  Assessment & Plan:  Referral to cardiology.     Orders:  -     Ambulatory Referral to Cardiology    7. Ebstein's anomaly  -     Ambulatory Referral to Cardiology        Await pap smear results.             "

## 2024-03-29 NOTE — ASSESSMENT & PLAN NOTE
Exam findings likely secondary to menses. Discussed preventative care with routine PAP smears and mammogram starting at age 40. Will determine further intervention based on results of PAP smear.

## 2024-04-03 LAB
CONV .: NORMAL
CYTOLOGIST CVX/VAG CYTO: NORMAL
CYTOLOGY CVX/VAG DOC CYTO: NORMAL
CYTOLOGY CVX/VAG DOC THIN PREP: NORMAL
DX ICD CODE: NORMAL
Lab: NORMAL
OTHER STN SPEC: NORMAL
STAT OF ADQ CVX/VAG CYTO-IMP: NORMAL

## 2024-05-20 ENCOUNTER — DOCUMENTATION (OUTPATIENT)
Dept: PSYCHIATRY | Facility: CLINIC | Age: 24
End: 2024-05-20

## 2024-05-20 ENCOUNTER — OFFICE VISIT (OUTPATIENT)
Dept: PSYCHIATRY | Facility: CLINIC | Age: 24
End: 2024-05-20
Payer: COMMERCIAL

## 2024-05-20 VITALS
SYSTOLIC BLOOD PRESSURE: 114 MMHG | BODY MASS INDEX: 30.18 KG/M2 | DIASTOLIC BLOOD PRESSURE: 60 MMHG | HEART RATE: 104 BPM | WEIGHT: 187.8 LBS | HEIGHT: 66 IN

## 2024-05-20 DIAGNOSIS — F41.1 GAD (GENERALIZED ANXIETY DISORDER): Primary | ICD-10-CM

## 2024-05-20 DIAGNOSIS — F43.10 POST TRAUMATIC STRESS DISORDER (PTSD): ICD-10-CM

## 2024-05-20 PROCEDURE — 90792 PSYCH DIAG EVAL W/MED SRVCS: CPT

## 2024-05-20 NOTE — PROGRESS NOTES
"[facility]  Initial Evaluation  TRIGGER WARNING     Referring Provider:  Lindsay Owusu, APRN  [address] [id number]    Chief Complaint: \"I am here for an DIANNE letter\"    History of Present Illness: Crystal Fitzgerald is a 23 y.o. female who presents today for initial evaluation regarding needing a DIANNE for her dog. Crystal presents accompanied with her partner, Leeann in no acute distress and engages with me appropriately. Psychotropic regimen with which patient presents is described as nothing.   Cassie is very guarded, trembling, and her eyes are downcast. She is picking at her eyebrows. Leeann, her partner, offered to step out but patient asked that she stays for support. Begrudgingly, patient discloses answers to my battery of questions. 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 Cassie is hesitant and evasive at times. Neither denying or confirming marijuana use. I provide information on the risks with using concentrated Delta 9 products and risks of psychoses, and supraventricular tachycardia.   Cassie   History is positive for signs/symptoms suggestive of history of significant trauma for which there are related intrusion symptoms related to the traumatic event-Orthodoxy trauma (distressing memories, flashbacks, nightmares, intense distress associated with triggering stimuli, marked physiological reactions to triggering stimuli), persistent avoidance of triggering stimuli, negative alterations in cognition and mood (memory lapses, negative schemas, distorted cognitions about the event, social withdrawal, feelings of detachment/estrangement, persistent anhedonia), and marked alterations in arousal and reactivity (irritability, reckless behavior, hypervigilance, exaggerated startle, sleep disturbances).:. a history of early exposure to enduring trauma associated with re-experiencing trauma, avoidance, " hyperarousal (PTSD) and difficulty managing emotions, negative self-view, relationship difficulties, dissociative symptoms, and demoralization (complex PTSD).    Psychiatric screening is negative for pathognomonic history of: TBI, angely, violence, and suicidality  I have counseled the patient with regard to diagnoses and the recommended treatment regimen as documented below: I will assume prescriptive for nothing at this time per patient's request. We discussed use of N. Acetyl cysteine for body-focused repetitive behavior, recommend N.A.C. 600 mg -1200 mg po q day, for insomnia try melatonin, no more than 6 mg po q HS for insomnia, for mood try L-methylfolate 7.5 mg -15 mg po q day. All these are over-the-counter and relatively safe, but run by PCP before starting.      Patient acknowledges the diagnoses per my rendered interpretation. Patient demonstrates awareness/understanding of viable alternatives for treatment as well as potential risks, benefits, and side effects associated with this regimen and is amenable to proceed in this fashion.     Recommended lifestyle changes: 30 minutes of activity to increase HR 2-3 days weekly.    Psychiatric History:  Diagnoses: anxiety, symptoms of OCD  Outpatient history: sees Amarjit Flores  Inpatient history: none  Medication trials: Citalopram   Other treatment modalities: Psychotherapy  Self harm: cutting  Suicide attempts: No  Abuse or neglect: emotional, Confucianism truama    Substance Abuse History:   Types/methods/frequency: *none  Transtheoretical stage: none    Social History:  Residence: [facility] house  Vocation: yes,   Source of income: Employed  Last grade completed: college  Pertinent developmental history: focus problems  Pertinent legal history: No history   Hobbies/interests: horseback riding, mara, [employer] books  Gnosticism: leans daoism  Exercise:walking 20 minutes/day  4 days/week   Dietary habits: no pertinent issues   Sleep hygiene: struggles  with vivid dreams  Social habits: closeknit with sister, dad, non-blood family  Sunlight: There are no concern for under-exposure.  Caffeine intake: coffee intake  Hydration habits: no pertinent issues    history: No    Social History     Socioeconomic History    Marital status: Single   Tobacco Use    Smoking status: Never     Passive exposure: Past    Smokeless tobacco: Never   Vaping Use    Vaping status: Former    Quit date: 3/15/2022    Substances: Nicotine, Flavoring    Devices: Disposable   Substance and Sexual Activity    Alcohol use: Not Currently     Comment: OCCASIONAL/SOCIAL    Drug use: Never    Sexual activity: Yes     Partners: Female     Birth control/protection: None, Same-sex partner     Access to Firearms: yes    Tobacco use counseling/intervention: N/A, patient does not use tobacco; patient was counseled with regard to risks of tobacco use and encouraged to consider quitting, with or without the use of adjunctive medication, by first setting a prospective quit date. Currently Precontemplation by transtheoretical model.     PHQ-9 Depression Screening  PHQ-9 Total Score: 11    Little interest or pleasure in doing things? 0-->not at all   Feeling down, depressed, or hopeless? 0-->not at all   Trouble falling or staying asleep, or sleeping too much? 3-->nearly every day   Feeling tired or having little energy? 2-->more than half the days   Poor appetite or overeating? 0-->not at all   Feeling bad about yourself - or that you are a failure or have let yourself or your family down? 0-->not at all   Trouble concentrating on things, such as reading the newspaper or watching television? 3-->nearly every day   Moving or speaking so slowly that other people could have noticed? Or the opposite - being so fidgety or restless that you have been moving around a lot more than usual? 3-->nearly every day   Thoughts that you would be better off dead, or of hurting yourself in some way? 0-->not at all    PHQ-9 Total Score 11     RITA-7  Feeling nervous, anxious or on edge: Nearly every day  Not being able to stop or control worrying: More than half the days  Worrying too much about different things: Several days  Trouble Relaxing: Nearly every day  Being so restless that it is hard to sit still: Several days  Feeling afraid as if something awful might happen: Several days  Becoming easily annoyed or irritable: More than half the days  RITA 7 Total Score: 13  If you checked any problems, how difficult have these problems made it for you to do your work, take care of things at home, or get along with other people: Very difficult    Problem List:  Patient Active Problem List   Diagnosis    Anxiety    Inattention    Difficulty concentrating    Encounter for routine gynecological examination with Papanicolaou smear of cervix    Ebstein's anomaly    History of Tracy-Parkinson-White (WPW) syndrome    Generalized anxiety disorder     Allergy:   Allergies   Allergen Reactions    Amoxicillin Hives    Iodine Rash        Discontinued Medications:  There are no discontinued medications.    Current Medications:   Current Outpatient Medications   Medication Sig Dispense Refill    omeprazole (priLOSEC) 20 MG capsule TAKE 1 CAPSULE BY MOUTH DAILY 90 capsule 1     No current facility-administered medications for this visit.     Past Medical History:  Past Medical History:   Diagnosis Date    Acid reflux     Anxiety 06/2017    Obsessive-compulsive disorder 06/2017    Self-injurious behavior 04/2016    No more seeked therapy     Past Surgical History:  Past Surgical History:   Procedure Laterality Date    APPENDECTOMY  2016    CARDIAC ABLATION      2013 and 2017    CARDIAC SURGERY  05/2015 & 02/2017    CYST REMOVAL  2017    buttock     Family History:   Family History   Problem Relation Age of Onset    No Known Problems Mother     No Known Problems Father     No Known Problems Sister     No Known Problems Brother     No Known Problems  "Maternal Aunt     No Known Problems Paternal Aunt     No Known Problems Maternal Uncle     No Known Problems Paternal Uncle     Cancer Maternal Grandfather     Stroke Maternal Grandfather     No Known Problems Maternal Grandmother     No Known Problems Paternal Grandfather     Cancer Paternal Grandmother         Skin Cancer    No Known Problems Cousin     ADD / ADHD Neg Hx     Alcohol abuse Neg Hx     Anxiety disorder Neg Hx     Bipolar disorder Neg Hx     Dementia Neg Hx     Depression Neg Hx     Drug abuse Neg Hx     OCD Neg Hx     Paranoid behavior Neg Hx     Schizophrenia Neg Hx     Seizures Neg Hx     Self-Injurious Behavior  Neg Hx     Suicide Attempts Neg Hx        Mental Status Exam:   Observations:  Appearance: Neat  Speech: Normal  Eye Contact: Normal  Motor Activity: Normal  Affect:Full  Comments:  Mood:Mood: Euthymic  Cognition  Orientation Impairment: None  Memory Impairment: None  Attention: Normal  Comments:  Perception  Hallucinations:None  Other:   Comments:  Thoughts:  Suicidality:None  Homicidality:None  Delusions:  None  Comments:  Behavior:Behavior: Cooperative  Insight: Insight: Good  Judgement:Insight: Good    Vital Signs:   /60   Pulse 104   Ht 167.6 cm (66\")   Wt 85.2 kg (187 lb 12.8 oz)   BMI 30.31 kg/m²    Lab Results:   Office Visit on 03/29/2024   Component Date Value Ref Range Status    Diagnosis 03/29/2024 Comment   Final    NEGATIVE FOR INTRAEPITHELIAL LESION OR MALIGNANCY.    Specimen adequacy: 03/29/2024 Comment   Final    Satisfactory for evaluation.  Endocervical and/or squamous metaplastic  cells (endocervical component) are present.    Clinician Provided ICD-10: 03/29/2024 Comment   Final    Z01.419    Performed by: 03/29/2024 Comment   Final    Isaura Simon, Cytotechnologist (ASCP)    . 03/29/2024 .   Final    Note: 03/29/2024 Comment   Final    The Pap smear is a screening test designed to aid in the detection of  premalignant and malignant conditions " of the uterine cervix.  It is not a  diagnostic procedure and should not be used as the sole means of detecting  cervical cancer.  Both false-positive and false-negative reports do occur.    Method: 03/29/2024 Comment   Final    This liquid based ThinPrep(R) pap test was screened with the  use of an image guided system.    Conv .conv 03/29/2024 Comment   Final    The HPV DNA reflex criteria were not met with this specimen result  therefore, no HPV testing was performed.    GARDNERELLA VAGINALIS 03/29/2024 Negative  Negative Final    TRICHOMONAS VAGINALIS 03/29/2024 Negative  Negative Final    SANKET SPECIES 03/29/2024 Negative  Negative Final       ASSESSMENT AND PLAN:    ICD-10-CM ICD-9-CM   1. RITA (generalized anxiety disorder)  F41.1 300.02   2. Post traumatic stress disorder (PTSD)  F43.10 309.81       Crystal who presents today for initial evaluation regarding . We have discussed the history and interpreted diagnoses as above as well as the treatment plan below, including potential R/B/SE of the recommended regimen of which the patient demonstrates understanding. Patient is agreeable to call 911 or go to the nearest ER should she become concerned for her own safety and/or the safety of those around her. There are not overt indices of acute angely/psychosis on evaluation today.     Medication regimen: nothing per patient request; patient is advised not to misuse prescribed medications or to use any exogenous substances that aren't disclosed to this provider as they may interact with the regimen to her detriment.   Risk Assessment: protracted risk is moderate, imminent risk is moderate.  Risk factors include: anxiety disorder, mood disorder, and recent/ongoing psychosocial stressors. Protective factors include: no known family history of suicidality, intact reality testing, no substance use disorder, no access to firearms, no present SI, no stated history of suicide attempts or self-harm, patient's exhibited  future-orientation, strong social support, and patient's cooperation with care. Do note that this is subject to change with the Jehovah's witness of new stressors, treatment non-adherence, use of substances, and/or new medical ails.  Monitoring: reviewed labs/imaging as populated above,PHQ-9 today is PHQ-9 Total Score: 11 /27, RITA-7 today is 13/21  Therapy: Amarjit Reyez  Follow-up: as needed  Communications: N/A    TREATMENT PLAN/GOALS: challenge patterns of living conducive to symptom burden, implement recommended regimen as above with augmentative, intermittent supportive psychotherapy to reduce symptom burden. Patient acknowledged and verbally consented to begin treatment as above. The importance of adherence to the recommended treatment and interval follow-up appointments was emphasized today. Patient was today advised to limit daily caffeine intake, hydrate appropriately, eat healthy and nutritious foods, engage sleep hygiene measures, engage appropriate exposure to sunlight, engage with hobbies in balance with life necessities, and exercise appropriate to their capacity to do so.     Billing: I have seen the patient today and considered her psychiatric complaints, rendered a diagnosis, and discussed treatment with the patient as above with which she consents.    Parts of this note are electronic transcriptions/translations of spoken language to printed text using the Dragon Dictation system.    Electronically signed by KOKI Sena, 05/20/24,

## 2024-05-20 NOTE — TREATMENT PLAN
Multi-Disciplinary Problems (from Behavioral Health Treatment Plan)      Active Problems       Problem: Anxiety  Start Date: 05/20/24      Problem Details: The patient self-scales this problem as a 2 with 10 being the worst.          Goal Priority Start Date Expected End Date End Date    Patient will develop and implement behavioral and cognitive strategies to reduce anxiety and irrational fears. -- 05/20/24 -- --    Goal Details: Progress toward goal:  The patient self-scales their progress related to this goal as a 2 with 10 being the worst.          Goal Intervention Frequency Start Date End Date    Help patient explore past emotional issues in relation to present anxiety. Weekly 05/20/24 --    Intervention Details: Duration of treatment until until remission of symptoms.          Goal Intervention Frequency Start Date End Date    Help patient develop an awareness of their cognitive and physical responses to anxiety. Weekly 05/20/24 --    Intervention Details: Duration of treatment until until remission of symptoms.                          Reviewed By       Caitlyn Hanks APRN 05/20/24 2258                     I have discussed and reviewed this treatment plan with the patient.  It has been printed for signatures.

## 2024-06-03 NOTE — PROGRESS NOTES
Chief Complaint  Establish Care, Tracy-Parkinson-White Syndrome, and epsteins anomaly      History of Present Illness  Crystal Fitzgerald presents to [facility]  Patient is a 24-year-old female with a previous history of Ebstein's anomaly as well as Tracy-Parkinson-White syndrome with previous ablation in 2013 and 17 who follows up today to establish cardiologist.  She has not experienced any tachycardic symptoms no syncope or presyncope.  She denies any lower extremity edema problems and has not had a change in over and rehabilitate.    Past Medical History:   Diagnosis Date    Acid reflux     Anxiety 06/2017    Obsessive-compulsive disorder 06/2017    Self-injurious behavior 04/2016    No more seeked therapy         Current Outpatient Medications:     omeprazole (priLOSEC) 20 MG capsule, TAKE 1 CAPSULE BY MOUTH DAILY, Disp: 90 capsule, Rfl: 1    There are no discontinued medications.  Allergies   Allergen Reactions    Amoxicillin Hives    Iodine Rash        Social History     Tobacco Use    Smoking status: Never     Passive exposure: Past    Smokeless tobacco: Never   Vaping Use    Vaping status: Former    Quit date: 3/15/2022    Substances: Nicotine, Flavoring    Devices: Disposable   Substance Use Topics    Alcohol use: Not Currently     Comment: OCCASIONAL/SOCIAL    Drug use: Never       Family History   Problem Relation Age of Onset    No Known Problems Mother     No Known Problems Father     No Known Problems Sister     No Known Problems Brother     No Known Problems Maternal Aunt     No Known Problems Paternal Aunt     No Known Problems Maternal Uncle     No Known Problems Paternal Uncle     Cancer Maternal Grandfather     Stroke Maternal Grandfather     No Known Problems Maternal Grandmother     No Known Problems Paternal Grandfather     Cancer Paternal Grandmother         Skin Cancer    No Known Problems Cousin     ADD / ADHD Neg Hx     Alcohol abuse Neg Hx     Anxiety disorder  "Neg Hx     Bipolar disorder Neg Hx     Dementia Neg Hx     Depression Neg Hx     Drug abuse Neg Hx     OCD Neg Hx     Paranoid behavior Neg Hx     Schizophrenia Neg Hx     Seizures Neg Hx     Self-Injurious Behavior  Neg Hx     Suicide Attempts Neg Hx         Objective     /70   Pulse 72   Ht 167.6 cm (66\")   Wt 84.4 kg (186 lb)   BMI 30.02 kg/m²       Physical Exam    General Appearance:   no acute distress  Alert and oriented x3  HENT:   lips not cyanotic  Atraumatic  Neck:  No jvd   supple  Respiratory:  no respiratory distress  normal breath sounds  no rales  Cardiovascular:  Regular rate and rhythm  no S3, no S4   no murmur  no rub  Extremities  No cyanosis  lower extremity edema: none    Skin:   warm, dry  No rashes    Result Review :     No results found for: \"PROBNP\"       Lab Results   Component Value Date    TSH 1.020 05/23/2023      Lab Results   Component Value Date    FREET4 1.48 05/23/2023      No results found for: \"DDIMERQUANT\"  No results found for: \"MG\"   No results found for: \"DIGOXIN\"   No results found for: \"TROPONINT\"   No results found for: \"POCTROP\"(                   ECG: NSR with no evidence of pre-excitation    Echo 1.22.2019:  1. Mild Ebstein's anomaly of the tricuspid valve.   2. Mild to moderate tricuspid regurgitation, similar to prior study.   3. Mild right atrial enlargement.   4. Mild right ventricular enlargement.   5. Normal right and left ventricular systolic function.   6. No pericardial effusion.          ECG 12 Lead    Date/Time: 6/10/2024 4:32 PM  Performed by: Yong Steiner MD    Authorized by: Yong Steiner MD  Comparison: compared with previous ECG   Similar to previous ECG  Rhythm: sinus rhythm         No results found for this or any previous visit.             The ASCVD Risk score (Joanne DK, et al., 2019) failed to calculate for the following reasons:    The 2019 ASCVD risk score is only valid for ages 40 to 79     Diagnoses and all orders for this " visit:    1. Ebstein's anomaly (Primary)  Assessment & Plan:  Previous echocardiogram and not revealed any evidence of any cardiac shunting nor any significant tricuspid egurgitation patient remains symptomatically stable we will go ahead and repeat echocardiogram    Orders:  -     Adult Transthoracic Echo Complete W/ Cont if Necessary Per Protocol; Future    2. History of Tracy-Parkinson-White (WPW) syndrome  Overview:  WPW with RFA of R posterior accessory pathway 2013 & 2017; Dr. Staples     Assessment & Plan:  Patient with no residual eccentric pathway seen on EKG today nor she had any symptomatic tachycardic episodes we will continue just with routine EKG monitoring if any symptomatic tachycardic spells will get monitor as well      Other orders  -     ECG Scan  -     ECG Scan  -     Holter Monitor Scan            Follow Up     Return in about 1 year (around 6/10/2025) for Follow with JAK ValdiviaG with F/U.          Patient was given instructions and counseling regarding her condition or for health maintenance advice. Please see specific information pulled into the AVS if appropriate.

## 2024-06-10 ENCOUNTER — OFFICE VISIT (OUTPATIENT)
Dept: CARDIOLOGY | Facility: CLINIC | Age: 24
End: 2024-06-10
Payer: COMMERCIAL

## 2024-06-10 VITALS
DIASTOLIC BLOOD PRESSURE: 70 MMHG | BODY MASS INDEX: 29.89 KG/M2 | HEART RATE: 72 BPM | WEIGHT: 186 LBS | SYSTOLIC BLOOD PRESSURE: 112 MMHG | HEIGHT: 66 IN

## 2024-06-10 DIAGNOSIS — Z86.79 HISTORY OF WOLFF-PARKINSON-WHITE (WPW) SYNDROME: ICD-10-CM

## 2024-06-10 DIAGNOSIS — Q22.5 EBSTEIN'S ANOMALY: Primary | ICD-10-CM

## 2024-06-10 PROCEDURE — 99204 OFFICE O/P NEW MOD 45 MIN: CPT | Performed by: INTERNAL MEDICINE

## 2024-06-10 PROCEDURE — 93000 ELECTROCARDIOGRAM COMPLETE: CPT | Performed by: INTERNAL MEDICINE

## 2024-06-10 NOTE — ASSESSMENT & PLAN NOTE
Patient with no residual eccentric pathway seen on EKG today nor she had any symptomatic tachycardic episodes we will continue just with routine EKG monitoring if any symptomatic tachycardic spells will get monitor as well

## 2024-06-10 NOTE — ASSESSMENT & PLAN NOTE
Previous echocardiogram and not revealed any evidence of any cardiac shunting nor any significant tricuspid egurgitation patient remains symptomatically stable we will go ahead and repeat echocardiogram

## 2024-12-02 ENCOUNTER — TELEMEDICINE (OUTPATIENT)
Dept: FAMILY MEDICINE CLINIC | Facility: TELEHEALTH | Age: 24
End: 2024-12-02
Payer: COMMERCIAL

## 2024-12-02 VITALS — TEMPERATURE: 96.8 F

## 2024-12-02 DIAGNOSIS — J06.9 URI, ACUTE: Primary | ICD-10-CM

## 2024-12-02 PROCEDURE — 99213 OFFICE O/P EST LOW 20 MIN: CPT | Performed by: NURSE PRACTITIONER

## 2024-12-02 RX ORDER — BROMPHENIRAMINE MALEATE, PSEUDOEPHEDRINE HYDROCHLORIDE, AND DEXTROMETHORPHAN HYDROBROMIDE 2; 30; 10 MG/5ML; MG/5ML; MG/5ML
5 SYRUP ORAL 4 TIMES DAILY PRN
Qty: 118 ML | Refills: 0 | Status: SHIPPED | OUTPATIENT
Start: 2024-12-02 | End: 2024-12-07

## 2024-12-02 RX ORDER — PREDNISONE 20 MG/1
20 TABLET ORAL 2 TIMES DAILY
Qty: 14 TABLET | Refills: 0 | Status: SHIPPED | OUTPATIENT
Start: 2024-12-02 | End: 2024-12-09

## 2024-12-02 NOTE — PROGRESS NOTES
You have chosen to receive care through a telehealth visit.  Do you consent to use a video/audio connection for your medical care today? Yes     BRENDA Fitzgerald is a 24 y.o. female  presents with complaint of intermittent dry cough and congestion, decreased appetitie with some weight loss, sore throat (7/10)  and low grade fever. She reports fever max 99.5. She is taking Robitussin and Mucinex with little improvement or relief. She reports these symptoms have been off and on for 2 months. She has not seen her PCP.     Review of Systems   Constitutional:  Positive for appetite change (decreased) and fatigue.   HENT:  Positive for congestion, postnasal drip, rhinorrhea and sore throat.    Respiratory:  Positive for cough.    Cardiovascular: Negative.    Gastrointestinal: Negative.    Musculoskeletal: Negative.    Allergic/Immunologic: Positive for environmental allergies.   Neurological: Negative.    Hematological: Negative.    Psychiatric/Behavioral: Negative.         Past Medical History:   Diagnosis Date    Acid reflux     Anxiety 06/2017    Obsessive-compulsive disorder 06/2017    Self-injurious behavior 04/2016    No more seeked therapy       Family History   Problem Relation Age of Onset    No Known Problems Mother     No Known Problems Father     No Known Problems Sister     No Known Problems Brother     No Known Problems Maternal Aunt     No Known Problems Paternal Aunt     No Known Problems Maternal Uncle     No Known Problems Paternal Uncle     Cancer Maternal Grandfather     Stroke Maternal Grandfather     No Known Problems Maternal Grandmother     No Known Problems Paternal Grandfather     Cancer Paternal Grandmother         Skin Cancer    No Known Problems Cousin     ADD / ADHD Neg Hx     Alcohol abuse Neg Hx     Anxiety disorder Neg Hx     Bipolar disorder Neg Hx     Dementia Neg Hx     Depression Neg Hx     Drug abuse Neg Hx     OCD Neg Hx     Paranoid behavior Neg Hx     Schizophrenia Neg Hx      Seizures Neg Hx     Self-Injurious Behavior  Neg Hx     Suicide Attempts Neg Hx        Social History     Socioeconomic History    Marital status: Single   Tobacco Use    Smoking status: Never     Passive exposure: Past    Smokeless tobacco: Never   Vaping Use    Vaping status: Former    Quit date: 3/15/2022    Substances: Nicotine, Flavoring    Devices: Disposable   Substance and Sexual Activity    Alcohol use: Not Currently     Comment: OCCASIONAL/SOCIAL    Drug use: Never    Sexual activity: Yes     Partners: Female     Birth control/protection: None, Partner of same sex         There were no vitals taken for this visit.    PHYSICAL EXAM  Physical Exam   Constitutional: She is oriented to person, place, and time. She appears well-developed and well-nourished. She does not have a sickly appearance. She does not appear ill. No distress.   HENT:   Head: Normocephalic and atraumatic.   Right Ear: Hearing normal.   Left Ear: Hearing normal.   Mouth/Throat: Mouth/Lips are normal.Mucous membranes are erythematous. No oropharyngeal exudate or tonsillar abscesses.   TM's clear   Oral pharynx with redness and mild erythremia. No exudate or swelling.    Pulmonary/Chest: Effort normal.  No respiratory distress. She no audible wheeze...  Lymphadenopathy:     She has no cervical adenopathy.   Neurological: She is alert and oriented to person, place, and time.   Psychiatric: She has a normal mood and affect.   Vitals reviewed.      Diagnoses and all orders for this visit:    1. URI, acute (Primary)  -     predniSONE (DELTASONE) 20 MG tablet; Take 1 tablet by mouth 2 (Two) Times a Day for 7 days.  Dispense: 14 tablet; Refill: 0  -     brompheniramine-pseudoephedrine-DM 30-2-10 MG/5ML syrup; Take 5 mL by mouth 4 (Four) Times a Day As Needed for Cough, Congestion or Allergies for up to 5 days.  Dispense: 118 mL; Refill: 0  -     POC Strep A, PCR (Roche Candice); Future  -     CANDICE FLU + SARS PCR; Future          FOLLOW-UP  As  discussed during visit with [facility], if symptoms worsen or fail to improve, follow-up with PCP/Urgent Care/Emergency Department.    Patient verbalizes understanding of medications, instructions for treatment and follow-up.    Jeannine Acevedo, KOKI  12/02/2024  09:42 EST    The use of a video visit has been reviewed with the patient and verbal informed consent has been obtained. Myself and Crystal Fitzgerald participated in this visit. The patient is located in  [facility], and I am located in [place], [place]. Vignanihart and Tyto  were utilized.

## 2024-12-13 ENCOUNTER — OFFICE VISIT (OUTPATIENT)
Dept: INTERNAL MEDICINE | Facility: CLINIC | Age: 24
End: 2024-12-13
Payer: COMMERCIAL

## 2024-12-13 VITALS
OXYGEN SATURATION: 99 % | WEIGHT: 174 LBS | DIASTOLIC BLOOD PRESSURE: 84 MMHG | SYSTOLIC BLOOD PRESSURE: 118 MMHG | TEMPERATURE: 97 F | HEIGHT: 66 IN | BODY MASS INDEX: 27.97 KG/M2 | RESPIRATION RATE: 14 BRPM | HEART RATE: 81 BPM

## 2024-12-13 DIAGNOSIS — R63.1 INCREASED THIRST: ICD-10-CM

## 2024-12-13 DIAGNOSIS — R63.4 WEIGHT LOSS: ICD-10-CM

## 2024-12-13 DIAGNOSIS — R53.83 FATIGUE, UNSPECIFIED TYPE: Primary | ICD-10-CM

## 2024-12-13 LAB
25(OH)D3 SERPL-MCNC: 15.1 NG/ML (ref 30–100)
ALBUMIN SERPL-MCNC: 4.3 G/DL (ref 3.5–5.2)
ALBUMIN/GLOB SERPL: 1.3 G/DL
ALP SERPL-CCNC: 63 U/L (ref 39–117)
ALT SERPL W P-5'-P-CCNC: 21 U/L (ref 1–33)
ANION GAP SERPL CALCULATED.3IONS-SCNC: 8 MMOL/L (ref 5–15)
AST SERPL-CCNC: 19 U/L (ref 1–32)
BASOPHILS # BLD AUTO: 0.03 10*3/MM3 (ref 0–0.2)
BASOPHILS NFR BLD AUTO: 0.3 % (ref 0–1.5)
BILIRUB SERPL-MCNC: 0.4 MG/DL (ref 0–1.2)
BUN SERPL-MCNC: 10 MG/DL (ref 6–20)
BUN/CREAT SERPL: 15.2 (ref 7–25)
CALCIUM SPEC-SCNC: 9.2 MG/DL (ref 8.6–10.5)
CHLORIDE SERPL-SCNC: 103 MMOL/L (ref 98–107)
CO2 SERPL-SCNC: 26 MMOL/L (ref 22–29)
CREAT SERPL-MCNC: 0.66 MG/DL (ref 0.57–1)
DEPRECATED RDW RBC AUTO: 36.3 FL (ref 37–54)
EGFRCR SERPLBLD CKD-EPI 2021: 125.8 ML/MIN/1.73
EOSINOPHIL # BLD AUTO: 0.2 10*3/MM3 (ref 0–0.4)
EOSINOPHIL NFR BLD AUTO: 2.3 % (ref 0.3–6.2)
ERYTHROCYTE [DISTWIDTH] IN BLOOD BY AUTOMATED COUNT: 11.6 % (ref 12.3–15.4)
FERRITIN SERPL-MCNC: 179 NG/ML (ref 13–150)
FOLATE SERPL-MCNC: 10.4 NG/ML (ref 4.78–24.2)
GLOBULIN UR ELPH-MCNC: 3.3 GM/DL
GLUCOSE SERPL-MCNC: 98 MG/DL (ref 65–99)
HBA1C MFR BLD: 5.3 % (ref 4.8–5.6)
HCT VFR BLD AUTO: 41 % (ref 34–46.6)
HGB BLD-MCNC: 13.3 G/DL (ref 12–15.9)
IMM GRANULOCYTES # BLD AUTO: 0.07 10*3/MM3 (ref 0–0.05)
IMM GRANULOCYTES NFR BLD AUTO: 0.8 % (ref 0–0.5)
IRON 24H UR-MRATE: 94 MCG/DL (ref 37–145)
IRON SATN MFR SERPL: 29 % (ref 20–50)
LYMPHOCYTES # BLD AUTO: 1.87 10*3/MM3 (ref 0.7–3.1)
LYMPHOCYTES NFR BLD AUTO: 21.7 % (ref 19.6–45.3)
MCH RBC QN AUTO: 28.1 PG (ref 26.6–33)
MCHC RBC AUTO-ENTMCNC: 32.4 G/DL (ref 31.5–35.7)
MCV RBC AUTO: 86.7 FL (ref 79–97)
MONOCYTES # BLD AUTO: 0.71 10*3/MM3 (ref 0.1–0.9)
MONOCYTES NFR BLD AUTO: 8.3 % (ref 5–12)
NEUTROPHILS NFR BLD AUTO: 5.72 10*3/MM3 (ref 1.7–7)
NEUTROPHILS NFR BLD AUTO: 66.6 % (ref 42.7–76)
NRBC BLD AUTO-RTO: 0 /100 WBC (ref 0–0.2)
PLATELET # BLD AUTO: 265 10*3/MM3 (ref 140–450)
PMV BLD AUTO: 10.5 FL (ref 6–12)
POTASSIUM SERPL-SCNC: 3.6 MMOL/L (ref 3.5–5.2)
PROT SERPL-MCNC: 7.6 G/DL (ref 6–8.5)
RBC # BLD AUTO: 4.73 10*6/MM3 (ref 3.77–5.28)
SODIUM SERPL-SCNC: 137 MMOL/L (ref 136–145)
T4 FREE SERPL-MCNC: 1.71 NG/DL (ref 0.92–1.68)
TIBC SERPL-MCNC: 322 MCG/DL (ref 298–536)
TRANSFERRIN SERPL-MCNC: 216 MG/DL (ref 200–360)
TSH SERPL DL<=0.05 MIU/L-ACNC: 0.83 UIU/ML (ref 0.27–4.2)
VIT B12 BLD-MCNC: 835 PG/ML (ref 211–946)
WBC NRBC COR # BLD AUTO: 8.6 10*3/MM3 (ref 3.4–10.8)

## 2024-12-13 PROCEDURE — 80050 GENERAL HEALTH PANEL: CPT | Performed by: NURSE PRACTITIONER

## 2024-12-13 PROCEDURE — 84439 ASSAY OF FREE THYROXINE: CPT | Performed by: NURSE PRACTITIONER

## 2024-12-13 PROCEDURE — 83036 HEMOGLOBIN GLYCOSYLATED A1C: CPT | Performed by: NURSE PRACTITIONER

## 2024-12-13 PROCEDURE — 82306 VITAMIN D 25 HYDROXY: CPT | Performed by: NURSE PRACTITIONER

## 2024-12-13 PROCEDURE — 86665 EPSTEIN-BARR CAPSID VCA: CPT | Performed by: NURSE PRACTITIONER

## 2024-12-13 PROCEDURE — 82728 ASSAY OF FERRITIN: CPT | Performed by: NURSE PRACTITIONER

## 2024-12-13 PROCEDURE — 82607 VITAMIN B-12: CPT | Performed by: NURSE PRACTITIONER

## 2024-12-13 PROCEDURE — 99213 OFFICE O/P EST LOW 20 MIN: CPT | Performed by: NURSE PRACTITIONER

## 2024-12-13 PROCEDURE — 86664 EPSTEIN-BARR NUCLEAR ANTIGEN: CPT | Performed by: NURSE PRACTITIONER

## 2024-12-13 PROCEDURE — 86644 CMV ANTIBODY: CPT | Performed by: NURSE PRACTITIONER

## 2024-12-13 PROCEDURE — 82746 ASSAY OF FOLIC ACID SERUM: CPT | Performed by: NURSE PRACTITIONER

## 2024-12-13 PROCEDURE — 84466 ASSAY OF TRANSFERRIN: CPT | Performed by: NURSE PRACTITIONER

## 2024-12-13 PROCEDURE — 86645 CMV ANTIBODY IGM: CPT | Performed by: NURSE PRACTITIONER

## 2024-12-13 PROCEDURE — 83540 ASSAY OF IRON: CPT | Performed by: NURSE PRACTITIONER

## 2024-12-13 NOTE — PROGRESS NOTES
"Chief Complaint  Weight Loss    Subjective      Crystal Fitzgerald is a 24 y.o. female who presents to [facility]     Patient reports she has been sick on and off since September with cough and congestion. Was never diagnosed with influenza or COVID, attributed to allergies and viral illness. States she still has the cough, recently completed a round of steroids which did help. This morning she did wake up with a sore throat. States she has also lost some weight without trying. She has not changed her diet or physical activity. States she may have eaten a little different with being sick. She has not been on an antibiotic through any of her illnesses. Denies sinus pressure. Did have shortness of breath when sick but not now. Denies night sweats, joint pain. Has been struggling with fatigue, she sleeps okay. She does not feel rested when she wakes up. She does not think she snores routinely. Admits to increased thirst. Family history of DM2 in father, DM1 in paternal cousin (dads sisters son).     Objective   Vital Signs:   Vitals:    12/13/24 0956   BP: 118/84   BP Location: Right arm   Patient Position: Sitting   Cuff Size: Adult   Pulse: 81   Resp: 14   Temp: 97 °F (36.1 °C)   TempSrc: Temporal   SpO2: 99%   Weight: 78.9 kg (174 lb)   Height: 167 cm (65.75\")     Body mass index is 28.3 kg/m².    Wt Readings from Last 3 Encounters:   12/13/24 78.9 kg (174 lb)   06/10/24 84.4 kg (186 lb)   05/20/24 85.2 kg (187 lb 12.8 oz)     BP Readings from Last 3 Encounters:   12/13/24 118/84   06/10/24 112/70   05/20/24 114/60       Health Maintenance   Topic Date Due    HPV VACCINES (1 - 3-dose series) Never done    HEPATITIS C SCREENING  Never done    ANNUAL PHYSICAL  05/23/2024    INFLUENZA VACCINE  07/01/2024    COVID-19 Vaccine (1 - 2024-25 season) Never done    Pneumococcal Vaccine 0-64 (1 of 2 - PCV) 03/29/2025 (Originally 6/8/2006)    CHLAMYDIA SCREENING  03/29/2025    " BMI FOLLOWUP  03/29/2025    PAP SMEAR  03/29/2027    TDAP/TD VACCINES (3 - Td or Tdap) 01/14/2030       Physical Exam  Constitutional:       Appearance: Normal appearance.   HENT:      Head: Normocephalic and atraumatic.      Nose: Nose normal.      Mouth/Throat:      Mouth: Mucous membranes are moist.      Pharynx: Oropharynx is clear.   Eyes:      Extraocular Movements: Extraocular movements intact.      Conjunctiva/sclera: Conjunctivae normal.      Pupils: Pupils are equal, round, and reactive to light.   Neck:      Thyroid: No thyroid mass, thyromegaly or thyroid tenderness.   Cardiovascular:      Rate and Rhythm: Normal rate and regular rhythm.      Heart sounds: Normal heart sounds.   Pulmonary:      Effort: Pulmonary effort is normal.      Breath sounds: Normal breath sounds.   Skin:     General: Skin is warm and dry.   Neurological:      General: No focal deficit present.      Mental Status: She is alert and oriented to person, place, and time.   Psychiatric:         Mood and Affect: Mood normal.         Behavior: Behavior normal.         Thought Content: Thought content normal.          Result Review :  The following data was reviewed by: KOKI French on 12/13/2024:         Procedures          Assessment & Plan  Fatigue, unspecified type  Exam without concern, will check labs today to rule out underlying etiology.   Orders:    CBC & Differential    Comprehensive Metabolic Panel    TSH    T4, Free    EBV Antibody Profile    CMV IgG IgM    Vitamin B12    Folate    Iron Profile    Ferritin    Vitamin D,25-Hydroxy    Hemoglobin A1c    Weight loss  Labs as discussed, including A1c.  Orders:    EBV Antibody Profile    CMV IgG IgM    Vitamin B12    Folate    Iron Profile    Ferritin    Vitamin D,25-Hydroxy    Hemoglobin A1c    Increased thirst    Orders:    Hemoglobin A1c        FOLLOW UP  Return if symptoms worsen or fail to improve.  Patient was given instructions and counseling regarding her condition or  for health maintenance advice. Please see specific information pulled into the AVS if appropriate.       Lindsay Owusu, KOKI  12/13/24  10:19 EST    CURRENT & DISCONTINUED MEDICATIONS  Current Outpatient Medications   Medication Instructions    omeprazole (PRILOSEC) 20 mg, Oral, Daily       There are no discontinued medications.

## 2024-12-14 LAB
CMV IGG SERPL IA-ACNC: <0.6 U/ML (ref 0–0.59)
CMV IGM SERPL IA-ACNC: <30 AU/ML (ref 0–29.9)

## 2024-12-16 LAB
EBV NA IGG SER IA-ACNC: >600 U/ML (ref 0–17.9)
EBV VCA IGG SER IA-ACNC: 224 U/ML (ref 0–17.9)
EBV VCA IGM SER IA-ACNC: <36 U/ML (ref 0–35.9)
SERVICE CMNT-IMP: ABNORMAL

## 2024-12-16 RX ORDER — ERGOCALCIFEROL 1.25 MG/1
50000 CAPSULE, LIQUID FILLED ORAL WEEKLY
Qty: 12 CAPSULE | Refills: 1 | Status: SHIPPED | OUTPATIENT
Start: 2024-12-16

## 2025-07-02 PROBLEM — L05.91 PILONIDAL CYST: Status: ACTIVE | Noted: 2025-07-02

## 2025-07-02 PROBLEM — J45.909 ASTHMA: Status: ACTIVE | Noted: 2025-07-02

## 2025-07-02 PROBLEM — Z86.79 HISTORY OF WOLFF-PARKINSON-WHITE (WPW) SYNDROME: Status: RESOLVED | Noted: 2023-05-24 | Resolved: 2025-07-02

## 2025-07-02 NOTE — PROGRESS NOTES
Chief Complaint  Follow-up and Ebsein's anomaly    Subjective            History of Present Illness  Crystal Fitzgerald is a 25-year-old female patient who presents to the office today for follow-up.    History of Present Illness  The patient presents for a follow-up of Tracy-Parkinson-White pattern and Ebstein's anomaly.    She reports no new or worsening cardiac symptoms. She is not currently on any medications.    She has experienced significant weight loss, approximately 50 to 60 pounds, without any intentional effort. This was brought to the attention of her primary care physician, who conducted several tests, all of which returned normal results. On 06/10/2024, her weight was recorded at 186 pounds, and today it is 164 pounds, indicating a little over 20-pound difference in the last year. She does not experience any abdominal pain or have any eating disorders. Her weight has fluctuated between 190 and 200 pounds over the past 2 years, but she now weighs around 160 pounds. She has not made any lifestyle changes that could account for this weight loss.    She also experiences acid reflux, for which she takes omeprazole, although it does not always provide relief.        PMH  Past Medical History:   Diagnosis Date    Acid reflux     Anxiety 06/2017    Obsessive-compulsive disorder 06/2017    Self-injurious behavior 04/2016    No more seeked therapy         ALLERGY  Allergies   Allergen Reactions    Amoxicillin Hives    Iodine Rash          SURGICALHX  Past Surgical History:   Procedure Laterality Date    APPENDECTOMY  2016    CARDIAC ABLATION      2013 and 2017    CARDIAC SURGERY  05/2015 & 02/2017    CYST REMOVAL  2017    buttock          SOC  Social History     Socioeconomic History    Marital status: Single   Tobacco Use    Smoking status: Never     Passive exposure: Past    Smokeless tobacco: Never   Vaping Use    Vaping status: Former    Start date: 8/2/2016    Quit date: 3/15/2022    Substances: Nicotine,  "Flavoring    Devices: Disposable   Substance and Sexual Activity    Alcohol use: Not Currently     Comment: OCCASIONAL/SOCIAL    Drug use: Never    Sexual activity: Yes     Partners: Female     Birth control/protection: None, Partner of same sex         FAMHX  Family History   Problem Relation Age of Onset    No Known Problems Mother     No Known Problems Father     No Known Problems Sister     No Known Problems Brother     No Known Problems Maternal Aunt     No Known Problems Paternal Aunt     No Known Problems Maternal Uncle     No Known Problems Paternal Uncle     Cancer Maternal Grandfather     Stroke Maternal Grandfather     No Known Problems Maternal Grandmother     No Known Problems Paternal Grandfather     Cancer Paternal Grandmother         Skin Cancer    No Known Problems Cousin     ADD / ADHD Neg Hx     Alcohol abuse Neg Hx     Anxiety disorder Neg Hx     Bipolar disorder Neg Hx     Dementia Neg Hx     Depression Neg Hx     Drug abuse Neg Hx     OCD Neg Hx     Paranoid behavior Neg Hx     Schizophrenia Neg Hx     Seizures Neg Hx     Self-Injurious Behavior  Neg Hx     Suicide Attempts Neg Hx           MEDSIGONLY  Current Outpatient Medications on File Prior to Visit   Medication Sig    omeprazole (priLOSEC) 20 MG capsule TAKE 1 CAPSULE BY MOUTH DAILY     No current facility-administered medications on file prior to visit.       Objective   BP 99/73   Pulse 80   Ht 167.6 cm (65.98\")   Wt 74.7 kg (164 lb 9.6 oz)   BMI 26.58 kg/m²       Physical Exam  HENT:      Head: Normocephalic.   Neck:      Vascular: No carotid bruit.   Cardiovascular:      Rate and Rhythm: Normal rate and regular rhythm.      Pulses: Normal pulses.      Heart sounds: Normal heart sounds. No murmur heard.  Pulmonary:      Effort: Pulmonary effort is normal.      Breath sounds: Normal breath sounds.   Musculoskeletal:      Cervical back: Neck supple.      Right lower leg: No edema.      Left lower leg: No edema.   Skin:     General: " "Skin is dry.   Neurological:      Mental Status: She is alert and oriented to person, place, and time.   Psychiatric:         Behavior: Behavior normal.       ECG 12 Lead    Date/Time: 7/3/2025 11:16 AM  Performed by: Nisa Cevallos APRN    Authorized by: Nisa Cevallos APRN  Comparison: compared with previous ECG from 6/10/2024  Similar to previous ECG  Rhythm: sinus rhythm  Rate: normal  BPM: 68  Conduction: conduction normal  ST Segments: ST segments normal  T Waves: T waves normal  QRS axis: normal  Other findings: low voltage    Clinical impression: normal ECG        Result Review :   The following data was reviewed by: KOKI Schroeder on 07/03/2025:  No results found for: \"PROBNP\"  CMP          12/13/2024    10:29   CMP   Glucose 98    BUN 10    Creatinine 0.66    EGFR 125.8    Sodium 137    Potassium 3.6    Chloride 103    Calcium 9.2    Total Protein 7.6    Albumin 4.3    Globulin 3.3    Total Bilirubin 0.4    Alkaline Phosphatase 63    AST (SGOT) 19    ALT (SGPT) 21    Albumin/Globulin Ratio 1.3    BUN/Creatinine Ratio 15.2    Anion Gap 8.0      CBC w/diff          12/13/2024    10:29   CBC w/Diff   WBC 8.60    RBC 4.73    Hemoglobin 13.3    Hematocrit 41.0    MCV 86.7    MCH 28.1    MCHC 32.4    RDW 11.6    Platelets 265    Neutrophil Rel % 66.6    Immature Granulocyte Rel % 0.8    Lymphocyte Rel % 21.7    Monocyte Rel % 8.3    Eosinophil Rel % 2.3    Basophil Rel % 0.3       Lab Results   Component Value Date    TSH 0.829 12/13/2024      Lab Results   Component Value Date    FREET4 1.71 (H) 12/13/2024      No results found for: \"DDIMERQUANT\"  No results found for: \"MG\"   No results found for: \"DIGOXIN\"   No results found for: \"TROPONINT\"             Assessment and Plan    Diagnoses and all orders for this visit:    1. Tracy-Parkinson-White (WPW) pattern (Primary)  -     ECG 12 Lead    2. Ebstein's anomaly  -     Adult Transthoracic Echo Complete W/ Cont if Necessary Per Protocol; " Future      Assessment & Plan  1. Tracy-Parkinson-White pattern:  - EKG shows normal sinus rhythm with a heart rate of 68.  - Blood pressure and heart rate are within normal ranges.  - Echocardiogram recommended to monitor tricuspid valve function.    2. Joby's anomaly:  - No new or worsening symptoms reported.  - Echocardiogram recommended to monitor tricuspid valve function.    3. Unintentional weight loss:  - Approximately 50 to 60 pounds lost without trying.  - Weight recorded at 186 pounds on 06/10/2024 and 164 pounds today.  - Previous workups, including hormonal and autoimmune tests, were normal except for low vitamin D levels.  - Further research to identify potential causes.  - Referral to a rheumatologist or endocrinologist if necessary for comprehensive workup.      Risks, benefits, and alternatives of treatment were discussed, including the importance of monitoring cardiac function through echocardiograms and the potential need for further specialist referrals to address unexplained weight loss.    Follow-up:  - Follow-up in 1 year with Dr. Steiner.      Follow Up   Return in about 1 year (around 7/3/2026) for Follow up with Dr Steiner.    Patient was given instructions and counseling regarding her condition or for health maintenance advice. Please see specific information pulled into the AVS if appropriate.     Crystal Fitzgerald  reports that she has never smoked. She has been exposed to tobacco smoke. She has never used smokeless tobacco.         Patient or patient representative verbalized consent for the use of Ambient Listening during the visit with  KOKI Schroeder for chart documentation. 7/3/2025  12:57 EDT    KOKI Schroeder  07/03/25  16:59 EDT    Dictated Utilizing Dragon Dictation

## 2025-07-03 ENCOUNTER — OFFICE VISIT (OUTPATIENT)
Dept: CARDIOLOGY | Facility: CLINIC | Age: 25
End: 2025-07-03
Payer: COMMERCIAL

## 2025-07-03 VITALS
HEART RATE: 80 BPM | BODY MASS INDEX: 26.45 KG/M2 | HEIGHT: 66 IN | SYSTOLIC BLOOD PRESSURE: 99 MMHG | WEIGHT: 164.6 LBS | DIASTOLIC BLOOD PRESSURE: 73 MMHG

## 2025-07-03 DIAGNOSIS — Q22.5 EBSTEIN'S ANOMALY: ICD-10-CM

## 2025-07-03 DIAGNOSIS — I45.6 WOLFF-PARKINSON-WHITE (WPW) PATTERN: Primary | ICD-10-CM

## 2025-07-03 PROCEDURE — 99214 OFFICE O/P EST MOD 30 MIN: CPT | Performed by: NURSE PRACTITIONER

## 2025-07-03 PROCEDURE — 93000 ELECTROCARDIOGRAM COMPLETE: CPT | Performed by: NURSE PRACTITIONER

## 2025-08-21 ENCOUNTER — TELEMEDICINE (OUTPATIENT)
Dept: PSYCHIATRY | Facility: CLINIC | Age: 25
End: 2025-08-21
Payer: COMMERCIAL

## 2025-08-21 DIAGNOSIS — F41.1 GAD (GENERALIZED ANXIETY DISORDER): Primary | ICD-10-CM

## 2025-08-21 DIAGNOSIS — F43.10 POST TRAUMATIC STRESS DISORDER (PTSD): ICD-10-CM
